# Patient Record
Sex: FEMALE | Race: ASIAN | NOT HISPANIC OR LATINO | URBAN - METROPOLITAN AREA
[De-identification: names, ages, dates, MRNs, and addresses within clinical notes are randomized per-mention and may not be internally consistent; named-entity substitution may affect disease eponyms.]

---

## 2019-12-16 ENCOUNTER — EMERGENCY (EMERGENCY)
Facility: HOSPITAL | Age: 54
LOS: 1 days | Discharge: HOME | End: 2019-12-16
Attending: EMERGENCY MEDICINE | Admitting: EMERGENCY MEDICINE
Payer: MEDICAID

## 2019-12-16 VITALS
SYSTOLIC BLOOD PRESSURE: 150 MMHG | HEART RATE: 61 BPM | HEIGHT: 64 IN | TEMPERATURE: 98 F | DIASTOLIC BLOOD PRESSURE: 69 MMHG | WEIGHT: 169.98 LBS | RESPIRATION RATE: 160 BRPM | OXYGEN SATURATION: 99 %

## 2019-12-16 VITALS
DIASTOLIC BLOOD PRESSURE: 89 MMHG | HEART RATE: 60 BPM | TEMPERATURE: 98 F | OXYGEN SATURATION: 99 % | RESPIRATION RATE: 16 BRPM | SYSTOLIC BLOOD PRESSURE: 152 MMHG

## 2019-12-16 DIAGNOSIS — O00.109 UNSPECIFIED TUBAL PREGNANCY WITHOUT INTRAUTERINE PREGNANCY: Chronic | ICD-10-CM

## 2019-12-16 DIAGNOSIS — D25.9 LEIOMYOMA OF UTERUS, UNSPECIFIED: Chronic | ICD-10-CM

## 2019-12-16 LAB
ALBUMIN SERPL ELPH-MCNC: 4 G/DL — SIGNIFICANT CHANGE UP (ref 3.3–5)
ALP SERPL-CCNC: 95 U/L — SIGNIFICANT CHANGE UP (ref 40–120)
ALT FLD-CCNC: 20 U/L — SIGNIFICANT CHANGE UP (ref 12–78)
ANION GAP SERPL CALC-SCNC: 7 MMOL/L — SIGNIFICANT CHANGE UP (ref 5–17)
APPEARANCE UR: CLEAR — SIGNIFICANT CHANGE UP
AST SERPL-CCNC: 19 U/L — SIGNIFICANT CHANGE UP (ref 15–37)
BACTERIA # UR AUTO: ABNORMAL
BASOPHILS # BLD AUTO: 0.05 K/UL — SIGNIFICANT CHANGE UP (ref 0–0.2)
BASOPHILS NFR BLD AUTO: 0.8 % — SIGNIFICANT CHANGE UP (ref 0–2)
BILIRUB SERPL-MCNC: 0.2 MG/DL — SIGNIFICANT CHANGE UP (ref 0.2–1.2)
BILIRUB UR-MCNC: NEGATIVE — SIGNIFICANT CHANGE UP
BUN SERPL-MCNC: 8 MG/DL — SIGNIFICANT CHANGE UP (ref 7–23)
CALCIUM SERPL-MCNC: 9.4 MG/DL — SIGNIFICANT CHANGE UP (ref 8.5–10.1)
CHLORIDE SERPL-SCNC: 109 MMOL/L — HIGH (ref 96–108)
CO2 SERPL-SCNC: 27 MMOL/L — SIGNIFICANT CHANGE UP (ref 22–31)
COLOR SPEC: SIGNIFICANT CHANGE UP
CREAT SERPL-MCNC: 0.66 MG/DL — SIGNIFICANT CHANGE UP (ref 0.5–1.3)
DIFF PNL FLD: ABNORMAL
EOSINOPHIL # BLD AUTO: 0.13 K/UL — SIGNIFICANT CHANGE UP (ref 0–0.5)
EOSINOPHIL NFR BLD AUTO: 2 % — SIGNIFICANT CHANGE UP (ref 0–6)
EPI CELLS # UR: SIGNIFICANT CHANGE UP
GLUCOSE SERPL-MCNC: 119 MG/DL — HIGH (ref 70–99)
GLUCOSE UR QL: NEGATIVE — SIGNIFICANT CHANGE UP
HCT VFR BLD CALC: 36.3 % — SIGNIFICANT CHANGE UP (ref 34.5–45)
HGB BLD-MCNC: 11.8 G/DL — SIGNIFICANT CHANGE UP (ref 11.5–15.5)
IMM GRANULOCYTES NFR BLD AUTO: 0.3 % — SIGNIFICANT CHANGE UP (ref 0–1.5)
KETONES UR-MCNC: NEGATIVE — SIGNIFICANT CHANGE UP
LEUKOCYTE ESTERASE UR-ACNC: ABNORMAL
LIDOCAIN IGE QN: 177 U/L — SIGNIFICANT CHANGE UP (ref 73–393)
LYMPHOCYTES # BLD AUTO: 2.58 K/UL — SIGNIFICANT CHANGE UP (ref 1–3.3)
LYMPHOCYTES # BLD AUTO: 39.6 % — SIGNIFICANT CHANGE UP (ref 13–44)
MCHC RBC-ENTMCNC: 26.7 PG — LOW (ref 27–34)
MCHC RBC-ENTMCNC: 32.5 GM/DL — SIGNIFICANT CHANGE UP (ref 32–36)
MCV RBC AUTO: 82.1 FL — SIGNIFICANT CHANGE UP (ref 80–100)
MONOCYTES # BLD AUTO: 0.5 K/UL — SIGNIFICANT CHANGE UP (ref 0–0.9)
MONOCYTES NFR BLD AUTO: 7.7 % — SIGNIFICANT CHANGE UP (ref 2–14)
NEUTROPHILS # BLD AUTO: 3.23 K/UL — SIGNIFICANT CHANGE UP (ref 1.8–7.4)
NEUTROPHILS NFR BLD AUTO: 49.6 % — SIGNIFICANT CHANGE UP (ref 43–77)
NITRITE UR-MCNC: NEGATIVE — SIGNIFICANT CHANGE UP
NRBC # BLD: 0 /100 WBCS — SIGNIFICANT CHANGE UP (ref 0–0)
PH UR: 7 — SIGNIFICANT CHANGE UP (ref 5–8)
PLATELET # BLD AUTO: 211 K/UL — SIGNIFICANT CHANGE UP (ref 150–400)
POTASSIUM SERPL-MCNC: 4.6 MMOL/L — SIGNIFICANT CHANGE UP (ref 3.5–5.3)
POTASSIUM SERPL-SCNC: 4.6 MMOL/L — SIGNIFICANT CHANGE UP (ref 3.5–5.3)
PROT SERPL-MCNC: 7.7 G/DL — SIGNIFICANT CHANGE UP (ref 6–8.3)
PROT UR-MCNC: NEGATIVE — SIGNIFICANT CHANGE UP
RBC # BLD: 4.42 M/UL — SIGNIFICANT CHANGE UP (ref 3.8–5.2)
RBC # FLD: 12.3 % — SIGNIFICANT CHANGE UP (ref 10.3–14.5)
RBC CASTS # UR COMP ASSIST: SIGNIFICANT CHANGE UP /HPF (ref 0–4)
SODIUM SERPL-SCNC: 143 MMOL/L — SIGNIFICANT CHANGE UP (ref 135–145)
SP GR SPEC: 1 — LOW (ref 1.01–1.02)
UROBILINOGEN FLD QL: NEGATIVE — SIGNIFICANT CHANGE UP
WBC # BLD: 6.51 K/UL — SIGNIFICANT CHANGE UP (ref 3.8–10.5)
WBC # FLD AUTO: 6.51 K/UL — SIGNIFICANT CHANGE UP (ref 3.8–10.5)
WBC UR QL: SIGNIFICANT CHANGE UP

## 2019-12-16 PROCEDURE — 96361 HYDRATE IV INFUSION ADD-ON: CPT

## 2019-12-16 PROCEDURE — 80053 COMPREHEN METABOLIC PANEL: CPT

## 2019-12-16 PROCEDURE — 36415 COLL VENOUS BLD VENIPUNCTURE: CPT

## 2019-12-16 PROCEDURE — 99284 EMERGENCY DEPT VISIT MOD MDM: CPT | Mod: 25

## 2019-12-16 PROCEDURE — 74177 CT ABD & PELVIS W/CONTRAST: CPT | Mod: 26

## 2019-12-16 PROCEDURE — 74177 CT ABD & PELVIS W/CONTRAST: CPT

## 2019-12-16 PROCEDURE — 83690 ASSAY OF LIPASE: CPT

## 2019-12-16 PROCEDURE — 96374 THER/PROPH/DIAG INJ IV PUSH: CPT | Mod: XU

## 2019-12-16 PROCEDURE — 99284 EMERGENCY DEPT VISIT MOD MDM: CPT

## 2019-12-16 PROCEDURE — 85027 COMPLETE CBC AUTOMATED: CPT

## 2019-12-16 PROCEDURE — 81001 URINALYSIS AUTO W/SCOPE: CPT

## 2019-12-16 RX ORDER — SUCRALFATE 1 G
10 TABLET ORAL
Qty: 150 | Refills: 0
Start: 2019-12-16 | End: 2019-12-20

## 2019-12-16 RX ORDER — SODIUM CHLORIDE 9 MG/ML
1000 INJECTION INTRAMUSCULAR; INTRAVENOUS; SUBCUTANEOUS ONCE
Refills: 0 | Status: COMPLETED | OUTPATIENT
Start: 2019-12-16 | End: 2019-12-16

## 2019-12-16 RX ORDER — SIMVASTATIN 20 MG/1
1 TABLET, FILM COATED ORAL
Qty: 0 | Refills: 0 | DISCHARGE

## 2019-12-16 RX ORDER — FAMOTIDINE 10 MG/ML
20 INJECTION INTRAVENOUS ONCE
Refills: 0 | Status: COMPLETED | OUTPATIENT
Start: 2019-12-16 | End: 2019-12-16

## 2019-12-16 RX ORDER — OMEPRAZOLE 10 MG/1
1 CAPSULE, DELAYED RELEASE ORAL
Qty: 0 | Refills: 0 | DISCHARGE

## 2019-12-16 RX ORDER — SUCRALFATE 1 G
1 TABLET ORAL ONCE
Refills: 0 | Status: COMPLETED | OUTPATIENT
Start: 2019-12-16 | End: 2019-12-16

## 2019-12-16 RX ADMIN — SODIUM CHLORIDE 1000 MILLILITER(S): 9 INJECTION INTRAMUSCULAR; INTRAVENOUS; SUBCUTANEOUS at 21:42

## 2019-12-16 RX ADMIN — FAMOTIDINE 20 MILLIGRAM(S): 10 INJECTION INTRAVENOUS at 22:36

## 2019-12-16 RX ADMIN — SODIUM CHLORIDE 1000 MILLILITER(S): 9 INJECTION INTRAMUSCULAR; INTRAVENOUS; SUBCUTANEOUS at 20:42

## 2019-12-16 RX ADMIN — Medication 1 GRAM(S): at 22:36

## 2019-12-16 NOTE — ED ADULT NURSE NOTE - OBJECTIVE STATEMENT
Patient came in to ED c/p lauren umbilical pain x 2-3 days worse after eating. Patient denies nausea/ vomiting, no  fever but reports body aches yesterday and took tylenol. States no pain at this time.

## 2019-12-16 NOTE — ED ADULT NURSE NOTE - NSIMPLEMENTINTERV_GEN_ALL_ED
Implemented All Universal Safety Interventions:  [place] to call system. Call bell, personal items and telephone within reach. Instruct patient to call for assistance. Room bathroom lighting operational. Non-slip footwear when patient is off stretcher. Physically safe environment: no spills, clutter or unnecessary equipment. Stretcher in lowest position, wheels locked, appropriate side rails in place.

## 2019-12-16 NOTE — ED PROVIDER NOTE - OBJECTIVE STATEMENT
53yo female who presents with abd pain for 3days. pt c/o diffuse periumbilical, no vomiting or diarrhea, no fever, chills, pt did not take anything for the pain

## 2019-12-16 NOTE — ED PROVIDER NOTE - PATIENT PORTAL LINK FT
You can access the FollowMyHealth Patient Portal offered by [facility] by registering at the following website: [email or web address]. By joining [email or web address]’s FollowMyHealth portal, you will also be able to view your health information using other applications (apps) compatible with our system.

## 2020-07-18 PROBLEM — K21.9 GASTRO-ESOPHAGEAL REFLUX DISEASE WITHOUT ESOPHAGITIS: Chronic | Status: ACTIVE | Noted: 2019-12-16

## 2020-07-18 PROBLEM — E78.00 PURE HYPERCHOLESTEROLEMIA, UNSPECIFIED: Chronic | Status: ACTIVE | Noted: 2019-12-16

## 2020-08-27 ENCOUNTER — APPOINTMENT (OUTPATIENT)
Dept: MAMMOGRAPHY | Facility: IMAGING CENTER | Age: 55
End: 2020-08-27

## 2020-08-27 ENCOUNTER — APPOINTMENT (OUTPATIENT)
Dept: ULTRASOUND IMAGING | Facility: IMAGING CENTER | Age: 55
End: 2020-08-27

## 2024-01-07 ENCOUNTER — EMERGENCY (EMERGENCY)
Facility: HOSPITAL | Age: 59
LOS: 1 days | Discharge: HOME | End: 2024-01-07
Attending: EMERGENCY MEDICINE
Payer: MEDICAID

## 2024-01-07 VITALS
DIASTOLIC BLOOD PRESSURE: 86 MMHG | RESPIRATION RATE: 16 BRPM | HEART RATE: 79 BPM | OXYGEN SATURATION: 97 % | SYSTOLIC BLOOD PRESSURE: 150 MMHG

## 2024-01-07 VITALS
HEIGHT: 64 IN | RESPIRATION RATE: 16 BRPM | OXYGEN SATURATION: 99 % | TEMPERATURE: 99 F | DIASTOLIC BLOOD PRESSURE: 86 MMHG | WEIGHT: 175.93 LBS | SYSTOLIC BLOOD PRESSURE: 169 MMHG | HEART RATE: 80 BPM

## 2024-01-07 DIAGNOSIS — O00.109 UNSPECIFIED TUBAL PREGNANCY WITHOUT INTRAUTERINE PREGNANCY: Chronic | ICD-10-CM

## 2024-01-07 DIAGNOSIS — Z87.59 PERSONAL HISTORY OF OTHER COMPLICATIONS OF PREGNANCY, CHILDBIRTH AND THE PUERPERIUM: Chronic | ICD-10-CM

## 2024-01-07 DIAGNOSIS — Z98.890 OTHER SPECIFIED POSTPROCEDURAL STATES: Chronic | ICD-10-CM

## 2024-01-07 DIAGNOSIS — D25.9 LEIOMYOMA OF UTERUS, UNSPECIFIED: Chronic | ICD-10-CM

## 2024-01-07 LAB
ALBUMIN SERPL ELPH-MCNC: 4.4 G/DL — SIGNIFICANT CHANGE UP (ref 3.3–5)
ALBUMIN SERPL ELPH-MCNC: 4.4 G/DL — SIGNIFICANT CHANGE UP (ref 3.3–5)
ALP SERPL-CCNC: 70 U/L — SIGNIFICANT CHANGE UP (ref 40–120)
ALP SERPL-CCNC: 70 U/L — SIGNIFICANT CHANGE UP (ref 40–120)
ALT FLD-CCNC: 19 U/L — SIGNIFICANT CHANGE UP (ref 10–45)
ALT FLD-CCNC: 19 U/L — SIGNIFICANT CHANGE UP (ref 10–45)
ANION GAP SERPL CALC-SCNC: 11 MMOL/L — SIGNIFICANT CHANGE UP (ref 5–17)
ANION GAP SERPL CALC-SCNC: 11 MMOL/L — SIGNIFICANT CHANGE UP (ref 5–17)
AST SERPL-CCNC: 22 U/L — SIGNIFICANT CHANGE UP (ref 10–40)
AST SERPL-CCNC: 22 U/L — SIGNIFICANT CHANGE UP (ref 10–40)
BASOPHILS # BLD AUTO: 0.02 K/UL — SIGNIFICANT CHANGE UP (ref 0–0.2)
BASOPHILS # BLD AUTO: 0.02 K/UL — SIGNIFICANT CHANGE UP (ref 0–0.2)
BASOPHILS NFR BLD AUTO: 0.4 % — SIGNIFICANT CHANGE UP (ref 0–2)
BASOPHILS NFR BLD AUTO: 0.4 % — SIGNIFICANT CHANGE UP (ref 0–2)
BILIRUB SERPL-MCNC: 0.3 MG/DL — SIGNIFICANT CHANGE UP (ref 0.2–1.2)
BILIRUB SERPL-MCNC: 0.3 MG/DL — SIGNIFICANT CHANGE UP (ref 0.2–1.2)
BUN SERPL-MCNC: 8 MG/DL — SIGNIFICANT CHANGE UP (ref 7–23)
BUN SERPL-MCNC: 8 MG/DL — SIGNIFICANT CHANGE UP (ref 7–23)
CALCIUM SERPL-MCNC: 9.2 MG/DL — SIGNIFICANT CHANGE UP (ref 8.4–10.5)
CALCIUM SERPL-MCNC: 9.2 MG/DL — SIGNIFICANT CHANGE UP (ref 8.4–10.5)
CHLORIDE SERPL-SCNC: 103 MMOL/L — SIGNIFICANT CHANGE UP (ref 96–108)
CHLORIDE SERPL-SCNC: 103 MMOL/L — SIGNIFICANT CHANGE UP (ref 96–108)
CO2 SERPL-SCNC: 25 MMOL/L — SIGNIFICANT CHANGE UP (ref 22–31)
CO2 SERPL-SCNC: 25 MMOL/L — SIGNIFICANT CHANGE UP (ref 22–31)
CREAT SERPL-MCNC: 0.62 MG/DL — SIGNIFICANT CHANGE UP (ref 0.5–1.3)
CREAT SERPL-MCNC: 0.62 MG/DL — SIGNIFICANT CHANGE UP (ref 0.5–1.3)
EGFR: 103 ML/MIN/1.73M2 — SIGNIFICANT CHANGE UP
EGFR: 103 ML/MIN/1.73M2 — SIGNIFICANT CHANGE UP
EOSINOPHIL # BLD AUTO: 0.12 K/UL — SIGNIFICANT CHANGE UP (ref 0–0.5)
EOSINOPHIL # BLD AUTO: 0.12 K/UL — SIGNIFICANT CHANGE UP (ref 0–0.5)
EOSINOPHIL NFR BLD AUTO: 2.7 % — SIGNIFICANT CHANGE UP (ref 0–6)
EOSINOPHIL NFR BLD AUTO: 2.7 % — SIGNIFICANT CHANGE UP (ref 0–6)
FLUAV AG NPH QL: DETECTED
FLUAV AG NPH QL: DETECTED
FLUBV AG NPH QL: SIGNIFICANT CHANGE UP
FLUBV AG NPH QL: SIGNIFICANT CHANGE UP
GLUCOSE SERPL-MCNC: 115 MG/DL — HIGH (ref 70–99)
GLUCOSE SERPL-MCNC: 115 MG/DL — HIGH (ref 70–99)
HCT VFR BLD CALC: 38.2 % — SIGNIFICANT CHANGE UP (ref 34.5–45)
HCT VFR BLD CALC: 38.2 % — SIGNIFICANT CHANGE UP (ref 34.5–45)
HGB BLD-MCNC: 12.6 G/DL — SIGNIFICANT CHANGE UP (ref 11.5–15.5)
HGB BLD-MCNC: 12.6 G/DL — SIGNIFICANT CHANGE UP (ref 11.5–15.5)
IMM GRANULOCYTES NFR BLD AUTO: 0.2 % — SIGNIFICANT CHANGE UP (ref 0–0.9)
IMM GRANULOCYTES NFR BLD AUTO: 0.2 % — SIGNIFICANT CHANGE UP (ref 0–0.9)
LYMPHOCYTES # BLD AUTO: 1.61 K/UL — SIGNIFICANT CHANGE UP (ref 1–3.3)
LYMPHOCYTES # BLD AUTO: 1.61 K/UL — SIGNIFICANT CHANGE UP (ref 1–3.3)
LYMPHOCYTES # BLD AUTO: 36.1 % — SIGNIFICANT CHANGE UP (ref 13–44)
LYMPHOCYTES # BLD AUTO: 36.1 % — SIGNIFICANT CHANGE UP (ref 13–44)
MAGNESIUM SERPL-MCNC: 2.1 MG/DL — SIGNIFICANT CHANGE UP (ref 1.6–2.6)
MAGNESIUM SERPL-MCNC: 2.1 MG/DL — SIGNIFICANT CHANGE UP (ref 1.6–2.6)
MCHC RBC-ENTMCNC: 27.5 PG — SIGNIFICANT CHANGE UP (ref 27–34)
MCHC RBC-ENTMCNC: 27.5 PG — SIGNIFICANT CHANGE UP (ref 27–34)
MCHC RBC-ENTMCNC: 33 GM/DL — SIGNIFICANT CHANGE UP (ref 32–36)
MCHC RBC-ENTMCNC: 33 GM/DL — SIGNIFICANT CHANGE UP (ref 32–36)
MCV RBC AUTO: 83.4 FL — SIGNIFICANT CHANGE UP (ref 80–100)
MCV RBC AUTO: 83.4 FL — SIGNIFICANT CHANGE UP (ref 80–100)
MONOCYTES # BLD AUTO: 0.37 K/UL — SIGNIFICANT CHANGE UP (ref 0–0.9)
MONOCYTES # BLD AUTO: 0.37 K/UL — SIGNIFICANT CHANGE UP (ref 0–0.9)
MONOCYTES NFR BLD AUTO: 8.3 % — SIGNIFICANT CHANGE UP (ref 2–14)
MONOCYTES NFR BLD AUTO: 8.3 % — SIGNIFICANT CHANGE UP (ref 2–14)
NEUTROPHILS # BLD AUTO: 2.33 K/UL — SIGNIFICANT CHANGE UP (ref 1.8–7.4)
NEUTROPHILS # BLD AUTO: 2.33 K/UL — SIGNIFICANT CHANGE UP (ref 1.8–7.4)
NEUTROPHILS NFR BLD AUTO: 52.3 % — SIGNIFICANT CHANGE UP (ref 43–77)
NEUTROPHILS NFR BLD AUTO: 52.3 % — SIGNIFICANT CHANGE UP (ref 43–77)
NRBC # BLD: 0 /100 WBCS — SIGNIFICANT CHANGE UP (ref 0–0)
NRBC # BLD: 0 /100 WBCS — SIGNIFICANT CHANGE UP (ref 0–0)
PLATELET # BLD AUTO: 201 K/UL — SIGNIFICANT CHANGE UP (ref 150–400)
PLATELET # BLD AUTO: 201 K/UL — SIGNIFICANT CHANGE UP (ref 150–400)
POTASSIUM SERPL-MCNC: 3.5 MMOL/L — SIGNIFICANT CHANGE UP (ref 3.5–5.3)
POTASSIUM SERPL-MCNC: 3.5 MMOL/L — SIGNIFICANT CHANGE UP (ref 3.5–5.3)
POTASSIUM SERPL-SCNC: 3.5 MMOL/L — SIGNIFICANT CHANGE UP (ref 3.5–5.3)
POTASSIUM SERPL-SCNC: 3.5 MMOL/L — SIGNIFICANT CHANGE UP (ref 3.5–5.3)
PROT SERPL-MCNC: 7.7 G/DL — SIGNIFICANT CHANGE UP (ref 6–8.3)
PROT SERPL-MCNC: 7.7 G/DL — SIGNIFICANT CHANGE UP (ref 6–8.3)
RBC # BLD: 4.58 M/UL — SIGNIFICANT CHANGE UP (ref 3.8–5.2)
RBC # BLD: 4.58 M/UL — SIGNIFICANT CHANGE UP (ref 3.8–5.2)
RBC # FLD: 12.3 % — SIGNIFICANT CHANGE UP (ref 10.3–14.5)
RBC # FLD: 12.3 % — SIGNIFICANT CHANGE UP (ref 10.3–14.5)
RSV RNA NPH QL NAA+NON-PROBE: SIGNIFICANT CHANGE UP
RSV RNA NPH QL NAA+NON-PROBE: SIGNIFICANT CHANGE UP
SARS-COV-2 RNA SPEC QL NAA+PROBE: SIGNIFICANT CHANGE UP
SARS-COV-2 RNA SPEC QL NAA+PROBE: SIGNIFICANT CHANGE UP
SODIUM SERPL-SCNC: 139 MMOL/L — SIGNIFICANT CHANGE UP (ref 135–145)
SODIUM SERPL-SCNC: 139 MMOL/L — SIGNIFICANT CHANGE UP (ref 135–145)
TROPONIN T, HIGH SENSITIVITY RESULT: <6 NG/L — SIGNIFICANT CHANGE UP (ref 0–51)
TROPONIN T, HIGH SENSITIVITY RESULT: <6 NG/L — SIGNIFICANT CHANGE UP (ref 0–51)
WBC # BLD: 4.46 K/UL — SIGNIFICANT CHANGE UP (ref 3.8–10.5)
WBC # BLD: 4.46 K/UL — SIGNIFICANT CHANGE UP (ref 3.8–10.5)
WBC # FLD AUTO: 4.46 K/UL — SIGNIFICANT CHANGE UP (ref 3.8–10.5)
WBC # FLD AUTO: 4.46 K/UL — SIGNIFICANT CHANGE UP (ref 3.8–10.5)

## 2024-01-07 PROCEDURE — 99285 EMERGENCY DEPT VISIT HI MDM: CPT | Mod: 25

## 2024-01-07 PROCEDURE — 85025 COMPLETE CBC W/AUTO DIFF WBC: CPT

## 2024-01-07 PROCEDURE — 71046 X-RAY EXAM CHEST 2 VIEWS: CPT

## 2024-01-07 PROCEDURE — 93005 ELECTROCARDIOGRAM TRACING: CPT

## 2024-01-07 PROCEDURE — 80053 COMPREHEN METABOLIC PANEL: CPT

## 2024-01-07 PROCEDURE — 71046 X-RAY EXAM CHEST 2 VIEWS: CPT | Mod: 26

## 2024-01-07 PROCEDURE — 84484 ASSAY OF TROPONIN QUANT: CPT

## 2024-01-07 PROCEDURE — 99285 EMERGENCY DEPT VISIT HI MDM: CPT

## 2024-01-07 PROCEDURE — 83735 ASSAY OF MAGNESIUM: CPT

## 2024-01-07 PROCEDURE — 36415 COLL VENOUS BLD VENIPUNCTURE: CPT

## 2024-01-07 PROCEDURE — 87637 SARSCOV2&INF A&B&RSV AMP PRB: CPT

## 2024-01-07 RX ORDER — ACETAMINOPHEN 500 MG
2 TABLET ORAL
Qty: 10 | Refills: 0
Start: 2024-01-07 | End: 2024-01-11

## 2024-01-07 NOTE — ED PROVIDER NOTE - CLINICAL SUMMARY MEDICAL DECISION MAKING FREE TEXT BOX
58-year-old female past medical history of gastritis, hypercholesterolemia, now presenting with ongoing dry cough, wheeze like sensation, left chest pain. HD stable. On exam reproducible left CW pain, R-basal creps. Likely Comm. acq. PNA. Will get labs, CXR. Will r/o ACS given c/o chest pain. 58-year-old female past medical history of gastritis, hypercholesterolemia, now presenting with ongoing dry cough, wheeze like sensation, left chest pain. HD stable. On exam reproducible left CW pain, R-basal creps. Likely Comm. acq. PNA. Will get labs, CXR. Will r/o ACS given c/o chest pain.    jens: 58 year old female with pmhx of gastritis here with ongoing dry cough, wheezing, left sided chest pain. Patient recently treated for CAP with z-pack, here for chest pain. PE: att exam: patient awake alert NAD . LUNGS R basilar crepitus, left chest wall ttp reproducible tenderness. CARD RRR no m/r/g.  Abdomen soft NT ND no rebound no guarding no CVA tenderness. EXT WWP no edema no calf tenderness CV 2+DP/PT bilaterally. neuro A&Ox3 gait normal.  skin warm and dry no rash. Plan: Patient with chest discomfort, otherwise well appearing. low risk for acs however will send labss, ekg, cardiac enzymes to r/o acs. will get cxr r/o pna, r/o viral syndrome. will possible broaden coverage for abx for CAP.

## 2024-01-07 NOTE — ED ADULT NURSE NOTE - CAS EDP DISCH TYPE
[No Acute Distress] : no acute distress [Well Nourished] : well nourished [Well Developed] : well developed [Well-Appearing] : well-appearing [Normal Sclera/Conjunctiva] : normal sclera/conjunctiva [PERRL] : pupils equal round and reactive to light [EOMI] : extraocular movements intact [Normal Outer Ear/Nose] : the outer ears and nose were normal in appearance [Normal Oropharynx] : the oropharynx was normal [No JVD] : no jugular venous distention [No Lymphadenopathy] : no lymphadenopathy [Supple] : supple [Thyroid Normal, No Nodules] : the thyroid was normal and there were no nodules present [No Respiratory Distress] : no respiratory distress  [No Accessory Muscle Use] : no accessory muscle use [Clear to Auscultation] : lungs were clear to auscultation bilaterally [Normal Rate] : normal rate  [Regular Rhythm] : with a regular rhythm [Normal S1, S2] : normal S1 and S2 [No Carotid Bruits] : no carotid bruits [No Abdominal Bruit] : a ~M bruit was not heard ~T in the abdomen [No Varicosities] : no varicosities [Pedal Pulses Present] : the pedal pulses are present [No Edema] : there was no peripheral edema [No Palpable Aorta] : no palpable aorta [No Extremity Clubbing/Cyanosis] : no extremity clubbing/cyanosis [Soft] : abdomen soft [Non Tender] : non-tender [Non-distended] : non-distended [No Masses] : no abdominal mass palpated [No HSM] : no HSM [Normal Bowel Sounds] : normal bowel sounds [Normal Posterior Cervical Nodes] : no posterior cervical lymphadenopathy [Normal Anterior Cervical Nodes] : no anterior cervical lymphadenopathy [No Focal Deficits] : no focal deficits [Normal Gait] : normal gait [Normal Affect] : the affect was normal [Normal Insight/Judgement] : insight and judgment were intact [de-identified] : 2/[facility] [place] [employer] [place]

## 2024-01-07 NOTE — ED PROVIDER NOTE - PROGRESS NOTE DETAILS
Kelly VIEYRA: Patient re-evaluated and feeling improved.  Lab and radiology tests reviewed with patient and family.  Patient is stable for discharge. Will offer Abx. for c/o bacterial PNA based on protracted symptoms and clinical findings. follow up with PCP. Follow up instructions given, importance of follow up emphasized, return to ED parameters reviewed and patient verbalized understanding.  All questions answered, all concerns addressed.

## 2024-01-07 NOTE — ED PROVIDER NOTE - NSFOLLOWUPCLINICS_GEN_ALL_ED_FT
[facility] Pulmonolgy and Sleep Medicine  Pulmonology  [address] [place] [address], [place] [address]  Phone: [phone]  Fax:      [facility]ol[facility] and [facility]  Pulmonology  [address] [place] [address], [place] [address]  Phone: [phone]  Fax:

## 2024-01-07 NOTE — ED PROVIDER NOTE - PATIENT PORTAL LINK FT
You can access the FollowMyHealth Patient Portal offered by [facility] by registering at the following website: [email or web address]. By joining [email or web address]’s FollowMyHealth portal, you will also be able to view your health information using other applications (apps) compatible with our system. You can access the FollowMyHealth Patient Portal offered by [facility] by registering at the following website: [email or web address]. By joining [employer]’s FollowMyHealth portal, you will also be able to view your health information using other applications (apps) compatible with our system.

## 2024-01-07 NOTE — ED PROVIDER NOTE - PHYSICAL EXAMINATION
PHYSICAL EXAM:  GENERAL: NAD, lying in bed comfortably  HEAD:  Atraumatic, Normocephalic  EYES: EOMI, PERRLA, conjunctiva and sclera clear  ENT: Hyperemic throat, no tonsillar exudates/enlargement   NECK: Supple, No JVD  LUNG: R-basal creps   HEART: RRR, +S1/S2; No m/r/g  ABDOMEN: soft, NT/ND; BS audible   EXTREMITIES:  2+ Peripheral Pulses, brisk cap refill. No clubbing, cyanosis, or edema  NERVOUS SYSTEM:  AAOx3, speech clear. No sensory/motor deficits   MSK: Left CW reproducible pain to palpation   SKIN: No rashes or lesions

## 2024-01-07 NOTE — ED ADULT NURSE NOTE - NSFALLUNIVINTERV_ED_ALL_ED
Bed/Stretcher in lowest position, wheels locked, appropriate side rails in place/Call bell, personal items and telephone in reach/Instruct patient to call for assistance before getting out of bed/chair/stretcher/Non-slip footwear applied when patient is off stretcher/[place] to call system/Physically safe environment - no spills, clutter or unnecessary equipment/Purposeful proactive rounding/Room/bathroom lighting operational, light cord in reach Bed/Stretcher in lowest position, wheels locked, appropriate side rails in place/Call bell, personal items and telephone in reach/Instruct patient to call for assistance before getting out of bed/chair/stretcher/Non-slip footwear applied when patient is off stretcher/[place] to call system/Physically safe environment - no spills, clutter or unnecessary equipment/Purposeful proactive rounding/Room/bathroom lighting operational, light cord in reach

## 2024-01-07 NOTE — ED ADULT NURSE NOTE - OBJECTIVE STATEMENT
Pti s 58y F with no PMH complaining of 6 days of cough, chills, congestion, fever, chest pain. Pt reports onset of symptoms 1/1, prescribed Z-pack by outpatient provider with no relief. Reports L sided chest pain radiating to L back. Also endorses congestion and diaphoresis. Upon assessment, A&Ox4, reports endorses L chest pain radiating to L back, with no SOB, n/v, lightheadedness. Family at bedside.

## 2024-01-07 NOTE — ED PROVIDER NOTE - NSFOLLOWUPINSTRUCTIONS_ED_ALL_ED_FT
Pneumonia is an infection of the lungs. It causes irritation and swelling in the airways of the lungs. Mucus and fluid may also build up inside the airways. This may cause coughing and trouble breathing.    One type of pneumonia can happen while your child is in a hospital. A different type can happen when your child is not in a hospital (community-acquired pneumonia).    What are the causes?  This condition is caused by germs (viruses or bacteria). Some types of germs can spread from person to person. Pneumonia is not thought to spread from person to person.    What increases the risk?  Your child is more likely to get pneumonia during the fall, winter, and spring. This is when children spend more time indoors and are near others.    What are the signs or symptoms?  Symptoms depend on your child's age and the cause of the illness. Pneumonia may be mild if caused by a virus. Symptoms may start slowly. If bacteria caused the pneumonia, symptoms may start fast. Fever may be higher.    Common symptoms of this condition include:  A cough.  A fever or chills.  Breathing problems, such as:  Shortness of breath.  Fast or shallow breathing.  Making high-pitched whistling sounds when breathing, most often when breathing out (wheezing).  Nostrils that open wide during breathing.  Pain in the chest or belly (abdomen).  Feeling tired.  Not wanting to eat.  Not wanting to play.  How is this treated?  Treatment for this condition depends on the cause and the symptoms.  Your child may be treated at home with rest or with:  Medicines to kill the germs.  Breathing therapy.  You may need to take your child to the hospital if:  Your child has a very bad infection. If your child's infection is very bad, they may:  Have a machine to help with breathing.  Have fluid taken away from around the lungs.  Follow these instructions at home:  Medicines    A sign showing not to give aspirin.  Give over-the-counter and prescription medicines only as told by your child's doctor.  If your child was prescribed an antibiotic medicine, give it as told by your child's doctor. Do not stop giving the antibiotic even if your child starts to feel better.  Do not give your child aspirin.  If your child is 4–6 years old, use cough medicine only as told by your child's doctor.  Give cough medicine only to help your child rest or sleep.  Do not give cough medicine if your child is younger than 4 years of age.  Activity    Be sure your child rests a lot. Your child may be tired and may want to do fewer things than normal.  Have your child return to their normal activities as told by your child's doctor. Ask the doctor what activities are safe for your child.  General instructions    A comparison of three sample cups showing dark yellow, yellow, and pale yellow urine.  Have your child sleep with the head and neck raised. Lying down makes coughing worse. To help with coughing during sleep:  Put more than one pillow under your child's head.  Have your child sleep in a reclining chair.  Loosen your child's mucus in their lungs (sputum):  Put a cool steam vaporizer or humidifier in your child's room. These machines add moisture to the air.  Have your child drink enough fluids to keep their pee (urine) pale yellow.  Wash your hands for at least 20 seconds before and after you touch your child. If you cannot use soap and water, use hand . Ask other people in your household to wash their hands often, too.  Keep your child away from smoke. Smoke can make symptoms worse.  Give your child a healthy diet. This includes a lot of vegetables, fruits, whole grains, low-fat dairy products, and low-fat (lean) protein.  Keep all follow-up visits.  How is pneumonia prevented?    Keep your child's shots (vaccines) up to date.  Make sure that you and everyone who cares for your child get shots for the flu and whooping cough (pertussis).  Contact a doctor if:  Your child gets new symptoms.  Your child's symptoms do not get better after 3 days of treatment, or as told by your child's doctor.  Your child's symptoms get worse over time.  Get help right away if:  Your child has breathing problems, such as:  Fast breathing.  Being short of breath and not able to talk normally.  Grunting sounds when your child breathes out.  Pain with breathing.  Loud breathing.  The spaces between the ribs or under the ribs pull in when your child breathes in.  Nostrils that open wide during breathing.  Your child who is younger than 3 months has a temperature of 100.4°F (38°C) or higher.  Your child who is 3 months to 3 years old has a temperature of 102.2°F (39°C) or higher.  Your child coughs up blood.  Your child vomits often.  Any symptoms get worse all of a sudden.  Your child's lips, face, or nails turn blue.  These symptoms may be an emergency. Do not wait to see if the symptoms will go away. Get help right away. Call 911.    Summary  A type of pneumonia can happen when your child is not in a hospital (community-acquired pneumonia). It may be caused by different germs.  Treatment for this condition depends on the cause and the symptoms.  Contact a doctor if your child gets new symptoms or has symptoms that do not get better after 3 days of treatment, or as told by your child's doctor.  This information is not intended to replace advice given to you by your health care provider. Make sure you discuss any questions you have with your health care provider.

## 2024-01-07 NOTE — ED PROVIDER NOTE - OBJECTIVE STATEMENT
58-year-old female past medical history of gastritis, hypercholesterolemia, now presenting with history of dry cough, fever, sore throat on 12/29, patient visited her PCP and was advised azithromycin -being her last day, patient reports improvement in fever, however has ongoing dry cough upon lying down and mentions no dyspnea or wheezing like sound noticed on 01/05.  Also reports experiencing left-sided chest pressure radiating to her left scapula, moderate in intensity, not accompanied with shortness of breath/lightheadedness/syncope/diaphoresis/palpitations.  Also has been having left-sided frontal headache for past 3 days. Patient reports being tested COVID -ve, has sick individuals at home.

## 2024-01-07 NOTE — ED PROVIDER NOTE - NS ED ROS FT
CONSTITUTIONAL: No weakness, fevers or chills  EYES/ENT: No visual changes;  No vertigo or throat pain   NECK: No pain or stiffness  RESPIRATORY: c/o cough, wheezing like sensation   CARDIOVASCULAR: c/o chest pain   GASTROINTESTINAL: No abdominal or epigastric pain. No nausea, vomiting, diarrhea or constipation.   GENITOURINARY: No dysuria, frequency   NEUROLOGICAL: c/o headache   SKIN: No itching, burning, rashes, or lesions     All other review of systems is negative unless indicated above.

## 2024-01-17 PROBLEM — Z00.00 ENCOUNTER FOR PREVENTIVE HEALTH EXAMINATION: Status: ACTIVE | Noted: 2024-01-17

## 2024-01-18 ENCOUNTER — APPOINTMENT (OUTPATIENT)
Dept: PULMONOLOGY | Facility: CLINIC | Age: 59
End: 2024-01-18
Payer: MEDICAID

## 2024-01-18 VITALS
DIASTOLIC BLOOD PRESSURE: 70 MMHG | WEIGHT: 173 LBS | BODY MASS INDEX: 29.53 KG/M2 | HEART RATE: 72 BPM | OXYGEN SATURATION: 99 % | SYSTOLIC BLOOD PRESSURE: 115 MMHG | HEIGHT: 64 IN

## 2024-01-18 DIAGNOSIS — K27.9 PEPTIC ULCER, SITE UNSPECIFIED, UNSPECIFIED AS ACUTE OR CHRONIC, W/OUT HEMORRHAGE OR PERFORATION: ICD-10-CM

## 2024-01-18 DIAGNOSIS — M17.10 UNILATERAL PRIMARY OSTEOARTHRITIS, UNSPECIFIED KNEE: ICD-10-CM

## 2024-01-18 DIAGNOSIS — Z82.49 FAMILY HISTORY OF ISCHEMIC HEART DISEASE AND OTHER DISEASES OF THE CIRCULATORY SYSTEM: ICD-10-CM

## 2024-01-18 DIAGNOSIS — Z82.41 FAMILY HISTORY OF SUDDEN CARDIAC DEATH: ICD-10-CM

## 2024-01-18 DIAGNOSIS — E78.00 PURE HYPERCHOLESTEROLEMIA, UNSPECIFIED: ICD-10-CM

## 2024-01-18 DIAGNOSIS — J06.9 ACUTE UPPER RESPIRATORY INFECTION, UNSPECIFIED: ICD-10-CM

## 2024-01-18 DIAGNOSIS — Z83.1 FAMILY HISTORY OF OTHER INFECTIOUS AND PARASITIC DISEASES: ICD-10-CM

## 2024-01-18 DIAGNOSIS — K21.9 GASTRO-ESOPHAGEAL REFLUX DISEASE W/OUT ESOPHAGITIS: ICD-10-CM

## 2024-01-18 PROCEDURE — ZZZZZ: CPT

## 2024-01-18 PROCEDURE — 94060 EVALUATION OF WHEEZING: CPT

## 2024-01-18 PROCEDURE — 94729 DIFFUSING CAPACITY: CPT

## 2024-01-18 PROCEDURE — 99205 OFFICE O/P NEW HI 60 MIN: CPT | Mod: 25

## 2024-01-18 PROCEDURE — 94726 PLETHYSMOGRAPHY LUNG VOLUMES: CPT

## 2024-01-18 RX ORDER — SIMVASTATIN 40 MG/1
40 TABLET, FILM COATED ORAL
Refills: 0 | Status: ACTIVE | COMMUNITY

## 2024-01-18 RX ORDER — ACETAMINOPHEN 500 MG/1
500 TABLET, COATED ORAL
Refills: 0 | Status: ACTIVE | COMMUNITY

## 2024-01-18 RX ORDER — METHYLPREDNISOLONE 4 MG/1
4 TABLET ORAL
Qty: 1 | Refills: 0 | Status: ACTIVE | COMMUNITY
Start: 2024-01-18 | End: 1900-01-01

## 2024-01-18 RX ORDER — BUDESONIDE AND FORMOTEROL FUMARATE DIHYDRATE 160; 4.5 UG/1; UG/1
160-4.5 AEROSOL RESPIRATORY (INHALATION) TWICE DAILY
Qty: 1 | Refills: 5 | Status: ACTIVE | COMMUNITY
Start: 2024-01-18 | End: 1900-01-01

## 2024-01-18 RX ORDER — ALUMINUM HYDROXIDE AND MAGNESIUM CARBONATE 95; 358 MG/15ML; MG/15ML
LIQUID ORAL
Refills: 0 | Status: ACTIVE | COMMUNITY

## 2024-01-18 RX ORDER — FAMOTIDINE 20 MG/1
20 TABLET, FILM COATED ORAL
Refills: 0 | Status: ACTIVE | COMMUNITY

## 2024-01-18 NOTE — REVIEW OF SYSTEMS
[Nasal Congestion] : nasal congestion [Sinus Problems] : sinus problems [Cough] : cough [Chest Tightness] : chest tightness [Sputum] : sputum [Dyspnea] : dyspnea [Wheezing] : wheezing [Negative] : Endocrine

## 2024-01-18 NOTE — CONSULT LETTER
[Dear  ___] : Dear  [unfilled], [Consult Letter:] : I had the pleasure of evaluating your patient, [unfilled]. [Please see my note below.] : Please see my note below. [Consult Closing:] : Thank you very much for allowing me to participate in the care of this patient.  If you have any questions, please do not hesitate to contact me. [Sincerely,] : Sincerely, [FreeTextEntry2] : Dr. Teri Johnson MD Fax: [phone] [FreeTextEntry3] : Mario Cobb MD Attending Physician in Pulmonary & Critical Care Medicine  of [facility] [employer] [facility]

## 2024-01-18 NOTE — ASSESSMENT
[FreeTextEntry1] : -  Ms. Cosme is a 58 year-old female with a history of GERD, PUD, HLD, and seasonal allergies who presents for evaluation after recent ED visit on 1/7 for dry cough, fever, chills, wheezing, dyspnea, and sore throat that had started on 12/29. She was initially given azithromycin with improvement in fever but with persistent dry cough and development of left-sided chest pressure and headache. She was found to have normal CBC and CMP. CXR with clear lungs. Viral studies positive for influenza A. She was out of the window for oseltamivir, but was treated with doxycycline, Augmentin, and benzonatate. Currently, she continues to experience cough along with wheezing, chest tightness, and chest discomfort bilaterally with associated tenderness to palpation. She did not receive any steroids. She uses fluticasone nasal spray 2 sprays per nostril once daily along with Neti pot nasal saline irrigation and steam. She takes loratadine 10 mg daily. Myalgias have improved.  PFTs (Jan 2024) with mild restriction, low TLC with low normal RV, mildly reduced diffusing capacity. There is no BD response in FEV1 or FVC, but there is a BD response in BIR91-93, suggestive of reversible small airways obstruction. She reports improvement with albuterol.  ASSESSMENT:  Influenza A viral infection with reactive airway disease Seasonal allergic rhinitis GERD  PLAN: - Completed course of azithromycin followed by Augmentin and doxycycline - Start methylprednisolone dose pack - Start Symbicort 160-4.5 mcg 2 puffs twice daily, rinse after use - AirSupra (albuterol-budesonide) inhaler 1-2 puffs q6h PRN - Note that mild restriction seen on PFTs today may be due to recent viral infection - Will consider repeat PFTs in 6 months to assess for stability in restriction - Neti pot nasal saline irrigation twice daily followed by fluticasone - Increase fluticasone nasal spray to 2 sprays per nostril twice daily - Continue Loratadine 10 mg daily - Benzonatate 200 mg 2-3 times/day as necessary for cough or chest congestion - Mucinex DM  mg twice daily PRN - For costochondritis due to recent coughing, she can take acetaminophen PRN - Prefer to avoid ibuprofen given history of PUD/GERD - Consider lidocaine patch or diclofenac gel topically PRN - Follow-up in 1 week or sooner - Discussed with patient and daughter

## 2024-01-18 NOTE — HISTORY OF PRESENT ILLNESS
[Never] : never [TextBox_4] : Ms. Cosme is a 58 year-old female with a history of GERD, PUD, HLD, and seasonal allergies who presents for evaluation after recent ED visit on 1/7 for dry cough, fever, chills, wheezing, dyspnea, and sore throat that had started on 12/29. She was initially given azithromycin with improvement in fever but with persistent dry cough and development of left-sided chest pressure and headache. She was found to have normal CBC and CMP. CXR with clear lungs. Viral studies positive for influenza A. She was out of the window for oseltamivir, but was treated with doxycycline, Augmentin, and benzonatate. Currently, she continues to experience cough along with wheezing, chest tightness, and chest discomfort bilaterally with associated tenderness to palpation. She did not receive any steroids. She uses fluticasone nasal spray 2 sprays per nostril once daily along with Neti pot nasal saline irrigation and steam. She takes loratadine 10 mg daily. Myalgias have improved.  PFTs (Jan 2024) with mild restriction, low TLC with low normal RV, mildly reduced diffusing capacity. There is no BD response in FEV1 or FVC, but there is a BD response in [id number], suggestive of reversible small airways obstruction. She reports improvement with albuterol.

## 2024-01-26 ENCOUNTER — APPOINTMENT (OUTPATIENT)
Dept: PULMONOLOGY | Facility: CLINIC | Age: 59
End: 2024-01-26
Payer: MEDICAID

## 2024-01-26 VITALS
BODY MASS INDEX: 29.6 KG/M2 | WEIGHT: 173.4 LBS | SYSTOLIC BLOOD PRESSURE: 132 MMHG | TEMPERATURE: 97.6 F | OXYGEN SATURATION: 99 % | RESPIRATION RATE: 16 BRPM | HEIGHT: 64 IN | HEART RATE: 80 BPM | DIASTOLIC BLOOD PRESSURE: 84 MMHG

## 2024-01-26 DIAGNOSIS — J30.2 OTHER SEASONAL ALLERGIC RHINITIS: ICD-10-CM

## 2024-01-26 PROCEDURE — 99214 OFFICE O/P EST MOD 30 MIN: CPT

## 2024-01-26 RX ORDER — FLUTICASONE PROPIONATE 50 UG/1
50 SPRAY, METERED NASAL TWICE DAILY
Qty: 1 | Refills: 5 | Status: ACTIVE | COMMUNITY
Start: 2024-01-26 | End: 1900-01-01

## 2024-01-26 RX ORDER — BENZONATATE 200 MG/1
200 CAPSULE ORAL
Qty: 30 | Refills: 1 | Status: ACTIVE | COMMUNITY
Start: 2024-01-26 | End: 1900-01-01

## 2024-01-26 NOTE — PHYSICAL EXAM
[No Acute Distress] : no acute distress [Well Nourished] : well nourished [Well Developed] : well developed [Normal Oropharynx] : normal oropharynx [Normal Appearance] : normal appearance [Supple] : supple [No Neck Mass] : no neck mass [No JVD] : no jvd [Normal Rate/Rhythm] : normal rate/rhythm [Normal Pulses] : normal pulses [Normal S1, S2] : normal s1, s2 [No Murmurs] : no murmurs [No Resp Distress] : no resp distress [Clear to Auscultation Bilaterally] : clear to auscultation bilaterally [No Abnormalities] : no abnormalities [Benign] : benign [Normal Gait] : normal gait [No Clubbing] : no clubbing [No Cyanosis] : no cyanosis [No Edema] : no edema [Normal Color/ Pigmentation] : normal color/ pigmentation [FROM] : FROM [No Focal Deficits] : no focal deficits [Cranial Nerves Intact] : cranial nerves intact [Oriented x3] : oriented x3 [Normal Affect] : normal affect

## 2024-01-26 NOTE — HISTORY OF PRESENT ILLNESS
[Never] : never [TextBox_4] : Ms. Cosme is a 58 year-old female with a history of GERD, PUD, HLD, and seasonal allergies who presents for follow-up. She was recently in the ED on 1/7/24 with dry cough, fever, chills, wheezing, dyspnea, and sore throat that had started on 12/29. She was initially given azithromycin with improvement in fever but with persistent dry cough and development of left-sided chest pressure and headache. Labs with normal CBC and CMP. CXR with clear lungs. Viral studies positive for influenza A. She was out of the window for oseltamivir, but was treated with doxycycline, Augmentin, and benzonatate. She continues to experience cough, wheezing, chest tightness, and chest discomfort due to costochondritis. She completed a course of methylprednisolone with improvement, but also developed weakness and dizziness. She is using Symbicort twice daily and has not required to use AirSupra. Unfortunately, she developed another viral URI with development of nasal congestion and cough. She is also using fluticasone nasal spray twice daily, nasal saline irrigation, and loratadine 10 mg daily. Costochondritis pain improved. She did not require to use lidocaine patch or diclofenac gel. She is using benzonatate and Robitussin DM prn. She reports improved dyspnea, wheezing, and cough.  PFTs (Jan 2024) with mild restriction, low TLC with low normal RV, mildly reduced diffusing capacity. There is no BD response in FEV1 or FVC, but there is a BD response in [id number], suggestive of reversible small airways obstruction. She reports improvement with albuterol.

## 2024-01-26 NOTE — ASSESSMENT
[FreeTextEntry1] : -  Ms. Cosme is a 58 year-old female with a history of GERD, PUD, HLD, and seasonal allergies who presents for follow-up. She was recently in the ED on 1/7/24 with dry cough, fever, chills, wheezing, dyspnea, and sore throat that had started on 12/29. She was initially given azithromycin with improvement in fever but with persistent dry cough and development of left-sided chest pressure and headache. Labs with normal CBC and CMP. CXR with clear lungs. Viral studies positive for influenza A. She was out of the window for oseltamivir, but was treated with doxycycline, Augmentin, and benzonatate. She continues to experience cough, wheezing, chest tightness, and chest discomfort due to costochondritis. She completed a course of methylprednisolone with improvement, but also developed weakness and dizziness. She is using Symbicort twice daily and has not required to use AirSupra. Unfortunately, she developed another viral URI with development of nasal congestion and cough. She is also using fluticasone nasal spray twice daily, nasal saline irrigation, and loratadine 10 mg daily. Costochondritis pain improved. She did not require to use lidocaine patch or diclofenac gel. She is using benzonatate and Robitussin DM prn. She reports improved dyspnea, wheezing, and cough.  PFTs (Jan 2024) with mild restriction, low TLC with low normal RV, mildly reduced diffusing capacity. There is no BD response in FEV1 or FVC, but there is a BD response in [id number], suggestive of reversible small airways obstruction. She reports improvement with albuterol.  ASSESSMENT:  Influenza A viral infection with reactive airway disease Seasonal allergic rhinitis GERD  PLAN: - Completed courses of azithromycin, Augmentin, and doxycycline - Completed methylprednisolone dose pack - Continue Symbicort 2 puffs twice daily for 1 week, then 1 puff twice daily, then PRN - AirSupra (albuterol-budesonide) inhaler 1-2 puffs q6h PRN - Monitor for asthma symptoms, including wheezing, chest tightness, cough, dyspnea, or nocturnal - Neti pot nasal saline irrigation 1-2x/day - Fluticasone nasal spray 2 sprays per nostril twice daily - Loratadine 10 mg daily - Benzonatate 200 mg 2-3 times/day as necessary for cough or chest congestion - Mucinex DM  mg twice daily PRN - Repeat PFTs with albuterol testing in May/June with next follow-up to evaluate for stability/improvement of pulmonary restriction - Influenza vaccine when clinically improved - Consider lidocaine patch or diclofenac gel topically PRN if she develops costochondritic chest pain - Follow-up in 3 months or sooner prn

## 2024-06-06 ENCOUNTER — APPOINTMENT (OUTPATIENT)
Dept: PULMONOLOGY | Facility: CLINIC | Age: 59
End: 2024-06-06

## 2024-06-12 PROBLEM — E78.5 HYPERLIPIDEMIA, UNSPECIFIED: Chronic | Status: ACTIVE | Noted: 2024-01-07

## 2024-06-12 PROBLEM — K29.70 GASTRITIS, UNSPECIFIED, WITHOUT BLEEDING: Chronic | Status: ACTIVE | Noted: 2024-01-07

## 2024-06-20 ENCOUNTER — NON-APPOINTMENT (OUTPATIENT)
Age: 59
End: 2024-06-20

## 2024-06-20 ENCOUNTER — APPOINTMENT (OUTPATIENT)
Dept: OTOLARYNGOLOGY | Facility: CLINIC | Age: 59
End: 2024-06-20
Payer: MEDICAID

## 2024-06-20 VITALS
HEIGHT: 64 IN | BODY MASS INDEX: 29.53 KG/M2 | SYSTOLIC BLOOD PRESSURE: 132 MMHG | HEART RATE: 67 BPM | WEIGHT: 173 LBS | DIASTOLIC BLOOD PRESSURE: 88 MMHG

## 2024-06-20 DIAGNOSIS — H90.71 MIXED CONDUCTIVE AND SENSORINEURAL HEARING LOSS, UNILATERAL, RIGHT EAR, WITH UNRESTRICTED HEARING ON THE CONTRALATERAL SIDE: ICD-10-CM

## 2024-06-20 DIAGNOSIS — J34.2 DEVIATED NASAL SEPTUM: ICD-10-CM

## 2024-06-20 DIAGNOSIS — J31.0 CHRONIC RHINITIS: ICD-10-CM

## 2024-06-20 DIAGNOSIS — J32.9 CHRONIC SINUSITIS, UNSPECIFIED: ICD-10-CM

## 2024-06-20 PROCEDURE — 31231 NASAL ENDOSCOPY DX: CPT

## 2024-06-20 PROCEDURE — 92567 TYMPANOMETRY: CPT

## 2024-06-20 PROCEDURE — 99204 OFFICE O/P NEW MOD 45 MIN: CPT | Mod: 25

## 2024-06-20 PROCEDURE — 92557 COMPREHENSIVE HEARING TEST: CPT

## 2024-06-20 RX ORDER — AMOXICILLIN AND CLAVULANATE POTASSIUM 875; 125 MG/1; MG/1
875-125 TABLET, COATED ORAL
Qty: 20 | Refills: 0 | Status: ACTIVE | COMMUNITY
Start: 2024-06-20 | End: 1900-01-01

## 2024-06-20 RX ORDER — FLUTICASONE PROPIONATE 50 UG/1
50 SPRAY, METERED NASAL TWICE DAILY
Qty: 1 | Refills: 1 | Status: ACTIVE | COMMUNITY
Start: 2024-06-20 | End: 1900-01-01

## 2024-06-20 NOTE — REVIEW OF SYSTEMS
[Sneezing] : sneezing [Seasonal Allergies] : seasonal allergies [Ear Pain] : ear pain [Ear Itch] : ear itch [Hearing Loss] : hearing loss [Ear Noises] : ear noises [Nasal Congestion] : nasal congestion [Recurrent Sinus Infections] : recurrent sinus infections [Problem Snoring] : problem snoring [Sinus Pain] : sinus pain [Sinus Pressure] : sinus pressure [Sense Of Smell Problem] : sense of smell problem [Eye Pain] : eye pain [Heartburn] : heartburn [Negative] : Heme/Lymph [FreeTextEntry7] : reflux [de-identified] : headache [FreeTextEntry1] : fatigue

## 2024-06-20 NOTE — HISTORY OF PRESENT ILLNESS
[de-identified] : Tish Cosme is a 57 yo female who presents for evaluation of hearing. She notes right sided hearing loss since 2007. She went to outside hospital at that time and had imaging, but unsure of results. She went to outside ENT in 2018 who noted hearing loss. She notes gradual hearing loss over time in the left ear. She notes intermittent right nonpulsatile tinnitus. She notes intermittent right otalgia. She denies otorrhea or vertigo. She denies recent fevers or chills. She notes that her cousin had hearing loss at a younger age. She denies exposure to ototoxic medications, head trauma, or loud noise exposure.   She has had chronic sinonasal symptoms over the past ten years. She notes chronic sinus pressure. She has chronic nasal congestion and postnasal drainage. She denies rhinorrhea. She denies vision changes or pain/restriction of extraocular movements. She notes 1-2 sinus infections per year. She is using sinus rinses, flonase, robitussin. She notes previous blood allergy testing and this was negative per her report.

## 2024-06-20 NOTE — ASSESSMENT
[FreeTextEntry1] : Jung Cosme presents for evaluation. She has history of chronic sinusitis and chronic rhinitis. Sinonasal endoscopy was performed showing septal deviation. We will start maximal medical therapy for sinusitis then obtain CT sinus for possible surgical planning.  She also has chronic hearing loss since 2007. Otoscopic exam today is normal. Audiogram was performed and reviewed showing type A tymps AU, moderate rising to mild mixed hearing loss AD, and hearing wnl AS. Her audiogram is suspicious for otosclerosis. Will obtain Ct temporal bone.  - start augmentin x 10 days. Side effects were discussed and include but are not limited to nausea, vomiting, diarrhea, and skin rash. - sinus rinses BID x 3 weeks. - start fluticasone 2 sprays BID x 3 weeks. - CT sinus and CT temporal bone in 3 weeks. - f/u after CT

## 2024-06-20 NOTE — DATA REVIEWED
[de-identified] : Audio 6/20/24: Type A tymps AU. AD - moderate rising to mild mixed hearing loss AS - hearing wnl

## 2024-07-18 ENCOUNTER — OUTPATIENT (OUTPATIENT)
Dept: OUTPATIENT SERVICES | Facility: HOSPITAL | Age: 59
LOS: 1 days | End: 2024-07-18
Payer: MEDICAID

## 2024-07-18 ENCOUNTER — APPOINTMENT (OUTPATIENT)
Dept: CT IMAGING | Facility: CLINIC | Age: 59
End: 2024-07-18
Payer: MEDICAID

## 2024-07-18 DIAGNOSIS — D25.9 LEIOMYOMA OF UTERUS, UNSPECIFIED: Chronic | ICD-10-CM

## 2024-07-18 DIAGNOSIS — Z87.59 PERSONAL HISTORY OF OTHER COMPLICATIONS OF PREGNANCY, CHILDBIRTH AND THE PUERPERIUM: Chronic | ICD-10-CM

## 2024-07-18 DIAGNOSIS — Z98.890 OTHER SPECIFIED POSTPROCEDURAL STATES: Chronic | ICD-10-CM

## 2024-07-18 DIAGNOSIS — H90.71 MIXED CONDUCTIVE AND SENSORINEURAL HEARING LOSS, UNILATERAL, RIGHT EAR, WITH UNRESTRICTED HEARING ON THE CONTRALATERAL SIDE: ICD-10-CM

## 2024-07-18 DIAGNOSIS — J32.9 CHRONIC SINUSITIS, UNSPECIFIED: ICD-10-CM

## 2024-07-18 DIAGNOSIS — O00.109 UNSPECIFIED TUBAL PREGNANCY WITHOUT INTRAUTERINE PREGNANCY: Chronic | ICD-10-CM

## 2024-07-18 PROCEDURE — 70480 CT ORBIT/EAR/FOSSA W/O DYE: CPT | Mod: 26

## 2024-07-18 PROCEDURE — 70486 CT MAXILLOFACIAL W/O DYE: CPT | Mod: 26

## 2024-07-18 PROCEDURE — 70486 CT MAXILLOFACIAL W/O DYE: CPT

## 2024-07-18 PROCEDURE — 70480 CT ORBIT/EAR/FOSSA W/O DYE: CPT

## 2024-08-07 ENCOUNTER — APPOINTMENT (OUTPATIENT)
Dept: OTOLARYNGOLOGY | Facility: CLINIC | Age: 59
End: 2024-08-07

## 2024-08-07 PROBLEM — M95.0 NASAL VALVE COLLAPSE: Status: ACTIVE | Noted: 2024-08-07

## 2024-08-07 PROCEDURE — 99214 OFFICE O/P EST MOD 30 MIN: CPT

## 2024-08-07 NOTE — PHYSICAL EXAM
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 [de-identified] : Bilateral inferior turbinate hypertrophy.

## 2024-08-07 NOTE — ASSESSMENT
[FreeTextEntry1] : Jung Cosme presents for follow-up. She has history of chronic sinusitis and chronic rhinitis. Sinonasal endoscopy at last visit showed septal deviation. She completed maximal medical therapy for sinusitis. She notes continued issues with nasal congestion and sinus pressure. CT sinus was reviewed showing mild bilateral septal deviation, left moris bullosa, and no significant sinus disease. 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 We discussed bilateral Vivaer procedure for nasal valve repair and inferior turbinate reduction. R/b/a discussed. Risks include but are not limited to bleeding, infection, crusting, scarring, injury to nasal mucosa, nasal swelling, persistence of symptoms, need for future surgery. All questions were answered. She will consider surgery.  She also has chronic hearing loss since 2007. Otoscopic exam is normal. Audiogram was performed and reviewed at last visit showing type A tymps AU, moderate rising to mild mixed hearing loss AD, and hearing wnl AS. Her audiogram is suspicious for otosclerosis. CT IAC was normal. Will refer her to Dr. Lockhart for further evaluation.  - sinus rinses BID prn - fluticasone 2 sprays BID prn - refer to Dr. Lockhart - f/u in 3 mo

## 2024-08-07 NOTE — HISTORY OF PRESENT ILLNESS
[de-identified] : Tish Cosme is a 57 yo female who presents for evaluation of hearing. She notes right sided hearing loss since 2007. She went to outside hospital at that time and had imaging, but unsure of results. She went to outside ENT in 2018 who noted hearing loss. She notes gradual hearing loss over time in the left ear. She notes intermittent right nonpulsatile tinnitus. She notes intermittent right otalgia. She denies otorrhea or vertigo. She denies recent fevers or chills. She notes that her cousin had hearing loss at a younger age. She denies exposure to ototoxic medications, head trauma, or loud noise exposure.   She has had chronic sinonasal symptoms over the past ten years. She notes chronic sinus pressure. She has chronic nasal congestion and postnasal drainage. She denies rhinorrhea. She denies vision changes or pain/restriction of extraocular movements. She notes 1-2 sinus infections per year. She is using sinus rinses, flonase, robitussin. She notes previous blood allergy testing and this was negative per her report. [FreeTextEntry1] : 8/7/24 - Ms. Cosme presents for follow-up. She notes no change in hearing. She completed maximal medical therapy for sinusitis. She notes after stopping antibiotics, she developed intermittent nasal congestion, sinus pressure, and postnasal drainage. No fevers.

## 2024-08-27 NOTE — DATA REVIEWED
[de-identified] : CT sinus 7/18/24: Findings:  FRONTAL SINUSES: The frontal sinuses are well-aerated.. The frontal sinus outflow tracts are patent.  ETHMOID SINUSES: Mild mucosal thickening ethmoidal sinuses.  SPHENOID SINUSES: The sphenoid sinuses are well-aerated. The sphenoethmoidal recesses are free of abnormal soft tissue bilaterally. There is protrusion of carotid canals into sphenoid sinuses with grossly intact bony coverage.  MAXILLARY SINUSES: The maxillary sinuses are well clear. The osteomeatal units are free of mucosal disease.  SINOCRANIAL AND SINOORBITAL JUNCTIONS: The bones adjacent to the sinuses, including the lamina papyracea, cribriform plates and fovea ethmoidalis, are grossly intact.  NASAL SEPTUM AND NASAL CAVITY: Sigmoid type septal septal deviation with bony spur. Moderate size left moris bullosa. No polypoid lesions in the nasal fossa.  OTHER: Intraorbital content is unremarkable. 4 mm nodular skin lesion lesion in the left cheek (6:120) can be correlated with direct visualization.  IMPRESSION:  No significant mucosal abnormality.  Nasal septal deviation. No polyps in the nasal fossa.   CT IAC 7/18/24: FINDINGS:  RIGHT:The external auditory canal is unremarkable. Tympanic membrane is not thickened. The mastoid air cells and middle ear cavity are well-aerated and clear. Hyperpneumatization of mastoid. There is bony thinning of the tympanic and mastoid tegmen without apparent bony defect . Bony coverage of superior semicircular canal is intact. Ossicular chain is intact. Normal ossification of the otic capsule.No appreciable demineralization in the fissula ante fenestrum. The facial nerve described normal course. The inner ear structures are normally formed. The internal auditory canal is normal in size. Normal bony coverage of jugular bulb and carotid canal. High riding jugular bulb.  LEFT:The external auditory canal is unremarkable. Tympanic membrane is not thickened. The mastoid air cells and middle ear cavity are well-aerated and clear. Hyperpneumatization of mastoid. There is bony thinning of the tympanic and mastoid tegmen without apparent bony defect . Bony coverage of superior semicircular canal is intact. Ossicular chain is intact. Normal ossification of the otic capsule. No appreciable demineralization in the fissula ante fenestrum. The facial nerve described normal course. The inner ear structures are normally formed. The internal auditory canal is normal in size. Normal bony coverage of jugular bulb and carotid canal.  IMPRESSION:  RIGHT: No appreciable bony demineralization, erosive changes or soft tissue lesions.  LEFT: No appreciable bony demineralization, erosive changes or soft tissue lesions. Additional Notes: Extraction with 16g needle and used high temp Ravindra cautery Detail Level: Zone Render Risk Assessment In Note?: no Additional Notes: High temp Ravindra Cautery Detail Level: Detailed

## 2024-10-03 ENCOUNTER — NON-APPOINTMENT (OUTPATIENT)
Age: 59
End: 2024-10-03

## 2024-10-22 ENCOUNTER — NON-APPOINTMENT (OUTPATIENT)
Age: 59
End: 2024-10-22

## 2024-11-04 ENCOUNTER — APPOINTMENT (OUTPATIENT)
Dept: PULMONOLOGY | Facility: CLINIC | Age: 59
End: 2024-11-04
Payer: MEDICAID

## 2024-11-04 DIAGNOSIS — J45.30 MILD PERSISTENT ASTHMA, UNCOMPLICATED: ICD-10-CM

## 2024-11-04 PROCEDURE — G2211 COMPLEX E/M VISIT ADD ON: CPT | Mod: NC,95

## 2024-11-04 PROCEDURE — 99215 OFFICE O/P EST HI 40 MIN: CPT | Mod: 95

## 2024-11-15 ENCOUNTER — APPOINTMENT (OUTPATIENT)
Dept: OTOLARYNGOLOGY | Facility: CLINIC | Age: 59
End: 2024-11-15

## 2024-11-30 NOTE — QUALITY MEASURES
Spirometry documented and] : spirometr documented and reviewed in record

## 2025-01-02 ENCOUNTER — NON-APPOINTMENT (OUTPATIENT)
Age: 60
End: 2025-01-02

## 2025-01-09 ENCOUNTER — APPOINTMENT (OUTPATIENT)
Dept: PULMONOLOGY | Facility: CLINIC | Age: 60
End: 2025-01-09
Payer: MEDICAID

## 2025-01-09 ENCOUNTER — LABORATORY RESULT (OUTPATIENT)
Age: 60
End: 2025-01-09

## 2025-01-09 VITALS
HEART RATE: 78 BPM | DIASTOLIC BLOOD PRESSURE: 81 MMHG | WEIGHT: 175 LBS | OXYGEN SATURATION: 99 % | SYSTOLIC BLOOD PRESSURE: 149 MMHG | HEIGHT: 64 IN | BODY MASS INDEX: 29.88 KG/M2

## 2025-01-09 DIAGNOSIS — R05.3 CHRONIC COUGH: ICD-10-CM

## 2025-01-09 DIAGNOSIS — J45.30 MILD PERSISTENT ASTHMA, UNCOMPLICATED: ICD-10-CM

## 2025-01-09 DIAGNOSIS — Z23 ENCOUNTER FOR IMMUNIZATION: ICD-10-CM

## 2025-01-09 DIAGNOSIS — J30.2 OTHER SEASONAL ALLERGIC RHINITIS: ICD-10-CM

## 2025-01-09 PROCEDURE — 94060 EVALUATION OF WHEEZING: CPT

## 2025-01-09 PROCEDURE — 95012 NITRIC OXIDE EXP GAS DETER: CPT

## 2025-01-09 PROCEDURE — 99215 OFFICE O/P EST HI 40 MIN: CPT | Mod: 25

## 2025-01-09 PROCEDURE — 94729 DIFFUSING CAPACITY: CPT

## 2025-01-09 PROCEDURE — 94726 PLETHYSMOGRAPHY LUNG VOLUMES: CPT

## 2025-01-09 PROCEDURE — ZZZZZ: CPT

## 2025-01-09 RX ORDER — BENZONATATE 200 MG/1
200 CAPSULE ORAL
Qty: 60 | Refills: 1 | Status: ACTIVE | COMMUNITY
Start: 2025-01-09 | End: 1900-01-01

## 2025-01-09 RX ORDER — AZELASTINE HYDROCHLORIDE 137 UG/1
0.1 SPRAY, METERED NASAL
Qty: 90 | Refills: 3 | Status: ACTIVE | COMMUNITY
Start: 2025-01-09 | End: 1900-01-01

## 2025-01-12 LAB
A ALTERNATA IGE QN: <0.1 KUA/L
A FUMIGATUS IGE QN: <0.1 KUA/L
BASOPHILS # BLD AUTO: 0.04 K/UL
BASOPHILS NFR BLD AUTO: 0.6 %
C ALBICANS IGE QN: <0.1 KUA/L
C HERBARUM IGE QN: <0.1 KUA/L
CAT DANDER IGE QN: <0.1 KUA/L
COMMON RAGWEED IGE QN: <0.1 KUA/L
D FARINAE IGE QN: <0.1 KUA/L
D PTERONYSS IGE QN: <0.1 KUA/L
DEPRECATED A ALTERNATA IGE RAST QL: 0
DEPRECATED A FUMIGATUS IGE RAST QL: 0
DEPRECATED C ALBICANS IGE RAST QL: 0
DEPRECATED C HERBARUM IGE RAST QL: 0
DEPRECATED CAT DANDER IGE RAST QL: 0
DEPRECATED COMMON RAGWEED IGE RAST QL: 0
DEPRECATED D FARINAE IGE RAST QL: 0
DEPRECATED D PTERONYSS IGE RAST QL: 0
DEPRECATED DOG DANDER IGE RAST QL: 0
DEPRECATED M RACEMOSUS IGE RAST QL: 0
DEPRECATED ROACH IGE RAST QL: NORMAL
DEPRECATED TIMOTHY IGE RAST QL: 0
DEPRECATED WHITE OAK IGE RAST QL: 0
DOG DANDER IGE QN: <0.1 KUA/L
EOSINOPHIL # BLD AUTO: 0.08 K/UL
EOSINOPHIL NFR BLD AUTO: 1.1 %
HCT VFR BLD CALC: 39.9 %
HGB BLD-MCNC: 12.8 G/DL
IGE SER-MCNC: 31 KU/L
IMM GRANULOCYTES NFR BLD AUTO: 0.1 %
LYMPHOCYTES # BLD AUTO: 1.73 K/UL
LYMPHOCYTES NFR BLD AUTO: 24.6 %
M RACEMOSUS IGE QN: <0.1 KUA/L
MAN DIFF?: NORMAL
MCHC RBC-ENTMCNC: 26.8 PG
MCHC RBC-ENTMCNC: 32.1 G/DL
MCV RBC AUTO: 83.6 FL
MONOCYTES # BLD AUTO: 0.35 K/UL
MONOCYTES NFR BLD AUTO: 5 %
NEUTROPHILS # BLD AUTO: 4.82 K/UL
NEUTROPHILS NFR BLD AUTO: 68.6 %
PLATELET # BLD AUTO: 245 K/UL
RBC # BLD: 4.77 M/UL
RBC # FLD: 12.6 %
ROACH IGE QN: 0.15 KUA/L
TIMOTHY IGE QN: <0.1 KUA/L
WBC # FLD AUTO: 7.03 K/UL
WHITE OAK IGE QN: <0.1 KUA/L

## 2025-01-16 ENCOUNTER — OUTPATIENT (OUTPATIENT)
Dept: OUTPATIENT SERVICES | Facility: HOSPITAL | Age: 60
LOS: 1 days | End: 2025-01-16
Payer: MEDICAID

## 2025-01-16 ENCOUNTER — APPOINTMENT (OUTPATIENT)
Dept: CT IMAGING | Facility: CLINIC | Age: 60
End: 2025-01-16
Payer: MEDICAID

## 2025-01-16 DIAGNOSIS — D25.9 LEIOMYOMA OF UTERUS, UNSPECIFIED: Chronic | ICD-10-CM

## 2025-01-16 DIAGNOSIS — Z87.59 PERSONAL HISTORY OF OTHER COMPLICATIONS OF PREGNANCY, CHILDBIRTH AND THE PUERPERIUM: Chronic | ICD-10-CM

## 2025-01-16 DIAGNOSIS — O00.109 UNSPECIFIED TUBAL PREGNANCY WITHOUT INTRAUTERINE PREGNANCY: Chronic | ICD-10-CM

## 2025-01-16 DIAGNOSIS — R05.3 CHRONIC COUGH: ICD-10-CM

## 2025-01-16 DIAGNOSIS — J45.30 MILD PERSISTENT ASTHMA, UNCOMPLICATED: ICD-10-CM

## 2025-01-16 DIAGNOSIS — Z98.890 OTHER SPECIFIED POSTPROCEDURAL STATES: Chronic | ICD-10-CM

## 2025-01-16 PROCEDURE — 71250 CT THORAX DX C-: CPT

## 2025-01-16 PROCEDURE — 71250 CT THORAX DX C-: CPT | Mod: 26

## 2025-01-17 LAB
A FLAVUS AB FLD QL: NEGATIVE
A FUMIGATUS AB FLD QL: NEGATIVE
A NIGER AB FLD QL: NEGATIVE
ANNOTATION COMMENT IMP: NORMAL
ELECTRONIC SIGNATURE: NORMAL
SERPINA1 GENE MUT TESTED BLD/T: NORMAL

## 2025-01-23 PROBLEM — R91.1 LUNG NODULE: Status: ACTIVE | Noted: 2025-01-23

## 2025-02-04 ENCOUNTER — EMERGENCY (EMERGENCY)
Facility: HOSPITAL | Age: 60
LOS: 1 days | Discharge: HOME | End: 2025-02-04
Attending: EMERGENCY MEDICINE | Admitting: STUDENT IN AN ORGANIZED HEALTH CARE EDUCATION/TRAINING PROGRAM
Payer: MEDICAID

## 2025-02-04 ENCOUNTER — APPOINTMENT (OUTPATIENT)
Dept: PULMONOLOGY | Facility: CLINIC | Age: 60
End: 2025-02-04
Payer: MEDICAID

## 2025-02-04 VITALS
HEART RATE: 67 BPM | SYSTOLIC BLOOD PRESSURE: 205 MMHG | DIASTOLIC BLOOD PRESSURE: 93 MMHG | WEIGHT: 171.96 LBS | TEMPERATURE: 98 F | RESPIRATION RATE: 16 BRPM | OXYGEN SATURATION: 98 % | HEIGHT: 64 IN

## 2025-02-04 VITALS
SYSTOLIC BLOOD PRESSURE: 192 MMHG | HEIGHT: 64 IN | OXYGEN SATURATION: 100 % | HEART RATE: 78 BPM | BODY MASS INDEX: 29.88 KG/M2 | DIASTOLIC BLOOD PRESSURE: 100 MMHG | WEIGHT: 175 LBS

## 2025-02-04 DIAGNOSIS — Z98.890 OTHER SPECIFIED POSTPROCEDURAL STATES: Chronic | ICD-10-CM

## 2025-02-04 DIAGNOSIS — Z87.59 PERSONAL HISTORY OF OTHER COMPLICATIONS OF PREGNANCY, CHILDBIRTH AND THE PUERPERIUM: Chronic | ICD-10-CM

## 2025-02-04 DIAGNOSIS — D25.9 LEIOMYOMA OF UTERUS, UNSPECIFIED: Chronic | ICD-10-CM

## 2025-02-04 DIAGNOSIS — O00.109 UNSPECIFIED TUBAL PREGNANCY WITHOUT INTRAUTERINE PREGNANCY: Chronic | ICD-10-CM

## 2025-02-04 LAB
ADD ON TEST-SPECIMEN IN LAB: SIGNIFICANT CHANGE UP
ALBUMIN SERPL ELPH-MCNC: 4.1 G/DL — SIGNIFICANT CHANGE UP (ref 3.3–5)
ALP SERPL-CCNC: 93 U/L — SIGNIFICANT CHANGE UP (ref 40–120)
ALT FLD-CCNC: 12 U/L — SIGNIFICANT CHANGE UP (ref 4–33)
ANION GAP SERPL CALC-SCNC: 15 MMOL/L — HIGH (ref 7–14)
APTT BLD: 34.7 SEC — SIGNIFICANT CHANGE UP (ref 24.5–35.6)
AST SERPL-CCNC: 17 U/L — SIGNIFICANT CHANGE UP (ref 4–32)
BASOPHILS # BLD AUTO: 0.05 K/UL — SIGNIFICANT CHANGE UP (ref 0–0.2)
BASOPHILS NFR BLD AUTO: 0.8 % — SIGNIFICANT CHANGE UP (ref 0–2)
BILIRUB SERPL-MCNC: 0.4 MG/DL — SIGNIFICANT CHANGE UP (ref 0.2–1.2)
BUN SERPL-MCNC: 9 MG/DL — SIGNIFICANT CHANGE UP (ref 7–23)
CALCIUM SERPL-MCNC: 9.9 MG/DL — SIGNIFICANT CHANGE UP (ref 8.4–10.5)
CHLORIDE SERPL-SCNC: 105 MMOL/L — SIGNIFICANT CHANGE UP (ref 98–107)
CO2 SERPL-SCNC: 17 MMOL/L — LOW (ref 22–31)
CREAT SERPL-MCNC: 0.58 MG/DL — SIGNIFICANT CHANGE UP (ref 0.5–1.3)
EGFR: 104 ML/MIN/1.73M2 — SIGNIFICANT CHANGE UP
EOSINOPHIL # BLD AUTO: 0.06 K/UL — SIGNIFICANT CHANGE UP (ref 0–0.5)
EOSINOPHIL NFR BLD AUTO: 1 % — SIGNIFICANT CHANGE UP (ref 0–6)
FLUAV AG NPH QL: SIGNIFICANT CHANGE UP
FLUBV AG NPH QL: SIGNIFICANT CHANGE UP
GAS PNL BLDV: SIGNIFICANT CHANGE UP
GLUCOSE SERPL-MCNC: 87 MG/DL — SIGNIFICANT CHANGE UP (ref 70–99)
HCT VFR BLD CALC: 37 % — SIGNIFICANT CHANGE UP (ref 34.5–45)
HGB BLD-MCNC: 12.4 G/DL — SIGNIFICANT CHANGE UP (ref 11.5–15.5)
IANC: 3.95 K/UL — SIGNIFICANT CHANGE UP (ref 1.8–7.4)
IMM GRANULOCYTES NFR BLD AUTO: 0.3 % — SIGNIFICANT CHANGE UP (ref 0–0.9)
INR BLD: 1.03 RATIO — SIGNIFICANT CHANGE UP (ref 0.85–1.16)
LYMPHOCYTES # BLD AUTO: 1.67 K/UL — SIGNIFICANT CHANGE UP (ref 1–3.3)
LYMPHOCYTES # BLD AUTO: 27.3 % — SIGNIFICANT CHANGE UP (ref 13–44)
MAGNESIUM SERPL-MCNC: 2 MG/DL — SIGNIFICANT CHANGE UP (ref 1.6–2.6)
MCHC RBC-ENTMCNC: 27.3 PG — SIGNIFICANT CHANGE UP (ref 27–34)
MCHC RBC-ENTMCNC: 33.5 G/DL — SIGNIFICANT CHANGE UP (ref 32–36)
MCV RBC AUTO: 81.3 FL — SIGNIFICANT CHANGE UP (ref 80–100)
MONOCYTES # BLD AUTO: 0.36 K/UL — SIGNIFICANT CHANGE UP (ref 0–0.9)
MONOCYTES NFR BLD AUTO: 5.9 % — SIGNIFICANT CHANGE UP (ref 2–14)
NEUTROPHILS # BLD AUTO: 3.95 K/UL — SIGNIFICANT CHANGE UP (ref 1.8–7.4)
NEUTROPHILS NFR BLD AUTO: 64.7 % — SIGNIFICANT CHANGE UP (ref 43–77)
NRBC # BLD AUTO: 0 K/UL — SIGNIFICANT CHANGE UP (ref 0–0)
NRBC # BLD: 0 /100 WBCS — SIGNIFICANT CHANGE UP (ref 0–0)
NRBC # FLD: 0 K/UL — SIGNIFICANT CHANGE UP (ref 0–0)
NRBC BLD-RTO: 0 /100 WBCS — SIGNIFICANT CHANGE UP (ref 0–0)
NT-PROBNP SERPL-SCNC: 109 PG/ML — SIGNIFICANT CHANGE UP
PHOSPHATE SERPL-MCNC: 3.8 MG/DL — SIGNIFICANT CHANGE UP (ref 2.5–4.5)
PLATELET # BLD AUTO: 218 K/UL — SIGNIFICANT CHANGE UP (ref 150–400)
POTASSIUM SERPL-MCNC: 4.5 MMOL/L — SIGNIFICANT CHANGE UP (ref 3.5–5.3)
POTASSIUM SERPL-SCNC: 4.5 MMOL/L — SIGNIFICANT CHANGE UP (ref 3.5–5.3)
PROT SERPL-MCNC: 7.9 G/DL — SIGNIFICANT CHANGE UP (ref 6–8.3)
PROTHROM AB SERPL-ACNC: 12 SEC — SIGNIFICANT CHANGE UP (ref 9.9–13.4)
RBC # BLD: 4.55 M/UL — SIGNIFICANT CHANGE UP (ref 3.8–5.2)
RBC # FLD: 12.2 % — SIGNIFICANT CHANGE UP (ref 10.3–14.5)
RSV RNA NPH QL NAA+NON-PROBE: SIGNIFICANT CHANGE UP
SARS-COV-2 RNA SPEC QL NAA+PROBE: SIGNIFICANT CHANGE UP
SODIUM SERPL-SCNC: 137 MMOL/L — SIGNIFICANT CHANGE UP (ref 135–145)
TROPONIN T, HIGH SENSITIVITY RESULT: 13 NG/L — SIGNIFICANT CHANGE UP
TROPONIN T, HIGH SENSITIVITY RESULT: 19 NG/L — SIGNIFICANT CHANGE UP
WBC # BLD: 6.11 K/UL — SIGNIFICANT CHANGE UP (ref 3.8–10.5)
WBC # FLD AUTO: 6.11 K/UL — SIGNIFICANT CHANGE UP (ref 3.8–10.5)

## 2025-02-04 PROCEDURE — ZZZZZ: CPT

## 2025-02-04 PROCEDURE — 93010 ELECTROCARDIOGRAM REPORT: CPT

## 2025-02-04 PROCEDURE — 71046 X-RAY EXAM CHEST 2 VIEWS: CPT | Mod: 26

## 2025-02-04 PROCEDURE — 99223 1ST HOSP IP/OBS HIGH 75: CPT

## 2025-02-04 RX ORDER — ATORVASTATIN CALCIUM 80 MG/1
20 TABLET, FILM COATED ORAL AT BEDTIME
Refills: 0 | Status: DISCONTINUED | OUTPATIENT
Start: 2025-02-04 | End: 2025-02-04

## 2025-02-04 RX ORDER — FLUTICASONE PROPIONATE 50 UG/1
1 SPRAY, METERED NASAL
Refills: 0 | Status: ACTIVE | OUTPATIENT
Start: 2025-02-04 | End: 2026-01-03

## 2025-02-04 RX ORDER — KETOROLAC TROMETHAMINE 10 MG
15 TABLET ORAL ONCE
Refills: 0 | Status: DISCONTINUED | OUTPATIENT
Start: 2025-02-04 | End: 2025-02-04

## 2025-02-04 RX ORDER — ROSUVASTATIN CALCIUM 10 MG/1
10 TABLET, FILM COATED ORAL AT BEDTIME
Refills: 0 | Status: ACTIVE | OUTPATIENT
Start: 2025-02-04 | End: 2026-01-03

## 2025-02-04 RX ORDER — FAMOTIDINE 10 MG/ML
20 INJECTION INTRAVENOUS
Refills: 0 | Status: ACTIVE | OUTPATIENT
Start: 2025-02-04 | End: 2026-01-03

## 2025-02-04 RX ORDER — CHLORTHALIDONE 100 MG
25 TABLET ORAL DAILY
Refills: 0 | Status: ACTIVE | OUTPATIENT
Start: 2025-02-05 | End: 2026-01-04

## 2025-02-04 RX ORDER — FLUTICASONE PROPIONATE AND SALMETEROL 113; 14 UG/1; UG/1
1 POWDER, METERED RESPIRATORY (INHALATION)
Refills: 0 | Status: ACTIVE | OUTPATIENT
Start: 2025-02-04 | End: 2026-01-03

## 2025-02-04 RX ORDER — CHLORTHALIDONE 100 MG
25 TABLET ORAL ONCE
Refills: 0 | Status: COMPLETED | OUTPATIENT
Start: 2025-02-04 | End: 2025-02-04

## 2025-02-04 RX ORDER — CETIRIZINE HCL 10 MG
10 TABLET ORAL DAILY
Refills: 0 | Status: ACTIVE | OUTPATIENT
Start: 2025-02-04 | End: 2026-01-03

## 2025-02-04 RX ORDER — ACETAMINOPHEN 160 MG/5ML
1000 SUSPENSION ORAL ONCE
Refills: 0 | Status: COMPLETED | OUTPATIENT
Start: 2025-02-04 | End: 2025-02-04

## 2025-02-04 RX ADMIN — Medication 25 MILLIGRAM(S): at 17:34

## 2025-02-04 RX ADMIN — FLUTICASONE PROPIONATE 1 SPRAY(S): 50 SPRAY, METERED NASAL at 23:11

## 2025-02-04 RX ADMIN — ACETAMINOPHEN 400 MILLIGRAM(S): 160 SUSPENSION ORAL at 17:09

## 2025-02-04 RX ADMIN — FAMOTIDINE 20 MILLIGRAM(S): 10 INJECTION INTRAVENOUS at 22:33

## 2025-02-04 RX ADMIN — ROSUVASTATIN CALCIUM 10 MILLIGRAM(S): 10 TABLET, FILM COATED ORAL at 23:11

## 2025-02-04 NOTE — ED CDU PROVIDER INITIAL DAY NOTE - OBJECTIVE STATEMENT
59-year-old female with past medical history of GERD, peptic ulcer disease, hyperlipidemia presents emergency department for episode of hypertension with associated lightheadedness and chest tightness today while at outpatient appointment.  Per daughter at bedside, the patient has been seeing pulmonology for evaluation as she has had 2 episodes of pneumonia within the past year.  She was having a methacholine challenge test today, when she was found to be hypertensive to the 200s over 110 range.  The patient does not currently take medication for her hypertension.  Patient endorses over the past few months she will have intermittent left-sided chest tightness.  Her symptoms were attributed to her lung disease, and recovering from pneumonia.  She believes she may have had an echocardiogram in the past, does not currently follow with a cardiologist and has not had provocative testing.  Denies recent fevers, chills, hemoptysis, weight loss, abdominal pain, GI or  symptoms.  The patient recently had an outpatient CT scan of the chest as part of her pulmonology workup, which was only notable for pulmonary nodules scattered that measure up to 3 mm.    CDU PA: 60 y/o F with pmhx of HLD, GERD, PUD who presents to the ED after having found to be hypertensive at her pulmonologist's office. Pt was at pulmonologist's office for a methacholine challenge test (pt has two episodes of pna in the last year and is being worked up for reactive airway ds vs restrictive airway ds). Pt was noted to be hypertensive with SBP in 200s. Pt notes associated lightheadedness, severe HA, left sided chest discomfort. Sx resolved after a few minutes. On arrival to the ED, pt was again noted to be hypertensive and felt another episode of lightheadedness. HR on pulse ox was 40. Pt denies a hx of hypertension. Pt does not currently have a cardiologist, believes she may have had an echo or stress test years ago.   In ED, VSS, hypertension improved to 150/82 without intervention, pt did receive chlorthalidone 25mg in the ED around 1800. Cardiac monitor with possible ventricular trigeminy. However EKG NSR. Labs unremarkable. Trop 19-->13. CXR with no acute findings. Viral swab negative. Pt placed in CDU for echo, stress per cardiology recommendations.

## 2025-02-04 NOTE — ED PROVIDER NOTE - ATTENDING CONTRIBUTION TO CARE
Attending note:   After face to face evaluation of this patient, I concur with above noted hx, pe, and care plan for this patient. Mcclellan: 59-year-old female with history of hyperlipidemia and multiple pneumonias in the last year.  Patient has been following with pulm and was going today for her pulmonary testing when she reported feeling lightheaded.  At the doctor's office she had multiple readings of elevated blood pressure.  Patient has no history of high blood pressure and does not take any medications for this.  Patient has had multiple stressors in her life recently.  Patient had episode of chest pressure while in the ED waiting area.  Patient's blood pressure is elevated but mildly improved in the room.  Patient's telemetry monitor is reading in the 70s to 80s and is notably in between bigeminy, trigeminy and sinus rhythm.  However because of this patient's pulse ox reads heart rate in the 40s occasionally as sometimes beats or not transmitted peripherally.  Patient denies any headache or blurry vision or numbness or weakness.  Lungs are clear and heart is regular rate and rhythm.  There is no JVD or pitting edema.  Pulses are equal and strong in all extremities.  Neuroexam is unremarkable.  Patient is normal sinus rhythm with flipped T's in 3 and poor R wave progression only.  Will check electrolytes and monitor patient.  Would recommend cardiology eval given patient's lightheadedness and significant PVCs.

## 2025-02-04 NOTE — CONSULT NOTE ADULT - SUBJECTIVE AND OBJECTIVE BOX
C A R D I O L O G Y  *********************    DATE OF SERVICE: 02-04-25    HISTORY OF PRESENT ILLNESS: HPI: Pt is a 59 year old female with a pmh of HLD, GERD presented from pulmonology office with elevated BP. Has had recurrent PNA and possible asthma and was following up in pulm office for methacholine challange for confimatory diagnosis. Was found to have BP in 190s/110s in office. Once in ER also found to have BP > 200s. She  states has had headaches and changes in vision as well as SOB. Had recent CT scan which showed aortic and coronary artery calcifications. No previous history of DM. HYN, smoking. Possibly has sleep apnea but no formal diagnosis.      PAST MEDICAL & SURGICAL HISTORY:  GERD (gastroesophageal reflux disease)  High cholesterol  Gastritis  HLD  Ectopic pregnancy, tubal  Uterine fibroid  History of ectopic pregnancy  S/P excision of lipoma    MEDICATIONS:  MEDICATIONS  (STANDING):  chlorthalidone 25 milliGRAM(s) Oral Once      Allergies: No Known Allergies    FAMILY HISTORY: Non-contributary for premature coronary disease or sudden cardiac death    SOCIAL HISTORY:    [X ] Non-smoker  [ ] Smoker  [ ] Alcohol      REVIEW OF SYSTEMS:  [ ]chest pain  [  ]shortness of breath  [  ]palpitations  [  ]syncope  [ ]near syncope [ ]upper extremity weakness   [ ] lower extremity weakness  [  ]diplopia  [  ]altered mental status   [  ]fevers  [ ]chills [ ]nausea  [ ]vomiting  [  ]dysphagia    [ ]abdominal pain  [ ]melena  [ ]BRBPR    [  ]epistaxis  [  ]rash    [ ]lower extremity edema    [X] All others negative	  [ ] Unable to obtain      LABS:	 	    CARDIAC MARKERS:                    12.4   6.11  )-----------( 218      ( 04 Feb 2025 13:50 )             37.0     Hb Trend: 12.4<--    02-04    137  |  105  |  9   ----------------------------<  87  4.5   |  17[L]  |  0.58    Ca    9.9      04 Feb 2025 13:50  Phos  3.8     02-04  Mg     2.00     02-04    TPro  7.9  /  Alb  4.1  /  TBili  0.4  /  DBili  x   /  AST  17  /  ALT  12  /  AlkPhos  93  02-04  Creatinine Trend: 0.58<--  Coags:  PT/INR - ( 04 Feb 2025 13:50 )   PT: 12.0 sec;   INR: 1.03 ratio    PTT - ( 04 Feb 2025 13:50 )  PTT:34.7 sec    PHYSICAL EXAM:  T(C): 36.6 (02-04-25 @ 12:52), Max: 36.7 (02-04-25 @ 11:23)  HR: 76 (02-04-25 @ 14:56) (40 - 76)  BP: 154/89 (02-04-25 @ 14:56) (154/89 - 205/93)  RR: 16 (02-04-25 @ 14:56) (16 - 21)  SpO2: 100% (02-04-25 @ 14:56) (98% - 100%)  Wt(kg): --   BMI (kg/m2): 29.5 (02-04-25 @ 11:23)  I&O's Summary    General:  Alert and Oriented * 3.   Head: Normocephalic and atraumatic.   Neck: No JVD. No bruits. Supple. Does not appear to be enlarged.   Cardiovascular: + S1,S2 ; RRR Soft systolic murmur at the left lower sternal border. No rubs noted.    Lungs: CTA b/l. No rhonchi, rales or wheezes.   Abdomen: + BS, soft. Non tender. Non distended. No rebound. No guarding.   Extremities: No clubbing/cyanosis/edema.   Neurologic: Moves all four extremities. Full range of motion.   Skin: Warm and moist. The patient's skin has normal elasticity and good skin turgor.   Psychiatric: Appropriate mood and affect.  Musculoskeletal: Normal range of motion, normal strength       TELEMETRY: 	 NSR and NSR with PVC in pattern of bigeminy     ECG:  	NSR    RADIOLOGY:         CXR:   < from: CT Chest No Cont (01.16.25 @ 11:04) >  IMPRESSION:    There are scattered small bilateral pulmonary nodules measuring up to 3   mm. For example right middle lobe (3:88). 2 mm calcified granuloma in the   left upper lobe. The lungs are otherwise clear. There are no   consolidations. Recommend follow-up noncontrast chest CT in one year.    < end of copied text >      ASSESSMENT/PLAN: 	 Pt is a 59 year old female with a pmh of HLD, GERD presented from pulmonology office with elevated BP. Has had recurrent PNA and possible asthma and was following up in pulm office for methacholine challange for confimatory diagnosis. Was found to have BP in 190s/110s in office. Once in ER also found to have BP > 200s. She  states has had headaches and changes in vision as well as SOB. Had recent CT scan which showed aortic and coronary artery calcifications. No previous history of DM. HYN, smoking. Possibly has sleep apnea but no formal diagnosis.      HTN/HLD  - EKG no acute ischemia, BP initally in 200s now improved to 150s without treatment  - check TTE  - Check NST  - Start Chlorthalidone 25 mg PO daily  - will set up in office for HOLTER and BP monitoring  - c/w statin      Lashay Owens MD  Pager: [phone]

## 2025-02-04 NOTE — ED CDU PROVIDER INITIAL DAY NOTE - DETAILS
Pt placed in CDU for further cardiac w/u including echo, stress. Pt seen by Dr. Lashay Owens while in ED.

## 2025-02-04 NOTE — ED CDU PROVIDER INITIAL DAY NOTE - ATTENDING APP SHARED VISIT CONTRIBUTION OF CARE
Attending note:   After face to face evaluation of this patient, I concur with above noted hx, pe, and care plan for this patient. Mcclellan: 59-year-old female with history of hyperlipidemia and multiple pneumonias in the last year.  Patient has been following with pulm and was going today for her pulmonary testing when she reported feeling lightheaded.  At the doctor's office she had multiple readings of elevated blood pressure.  Patient has no history of high blood pressure and does not take any medications for this.  Patient has had multiple stressors in her life recently.  Patient had episode of chest pressure while in the ED waiting area.  Patient's blood pressure is elevated but mildly improved in the room.  Patient's telemetry monitor is reading in the 70s to 80s and is notably in between bigeminy, trigeminy and sinus rhythm.  However because of this patient's pulse ox reads heart rate in the 40s occasionally as sometimes beats or not transmitted peripherally.  Patient denies any headache or blurry vision or numbness or weakness.  Lungs are clear and heart is regular rate and rhythm.  There is no JVD or pitting edema.  Pulses are equal and strong in all extremities.  Neuroexam is unremarkable.  Patient is normal sinus rhythm with flipped T's in 3 and poor R wave progression only.  Will check electrolytes and monitor patient.  Would recommend cardiology eval given patient's lightheadedness and significant PVCs which will happened in CDU.

## 2025-02-04 NOTE — ED PROVIDER NOTE - NSICDXPASTMEDICALHX_GEN_ALL_CORE_FT
PAST MEDICAL HISTORY:  Gastritis     GERD (gastroesophageal reflux disease)     High cholesterol     HLD (hyperlipidemia)

## 2025-02-04 NOTE — ED ADULT TRIAGE NOTE - CHIEF COMPLAINT QUOTE
Patient sent from MD office for elevated BP, patient also reports neck pain, headache and blurry vision x 2-3 days. Denies chest pain, SOB. Denies history of HTN. Phx HLD Patient sent from MD office for elevated BP, patient also reports neck pain, headache and blurry vision x 2-3 days. Denies chest pain, SOB. Denies history of HTN. Phx HLD    1212: patient reporting lightheadedness, HR 40-60, charge RN made aware

## 2025-02-04 NOTE — ED CDU PROVIDER INITIAL DAY NOTE - CLINICAL SUMMARY MEDICAL DECISION MAKING FREE TEXT BOX
58 y/o F with pmhx of HLD, GERD, PUD who presents to the ED after having found to be hypertensive at her pulmonologist's office. Pt was at pulmonologist's office for a methacholine challenge test (pt has two episodes of pna in the last year and is being worked up for reactive airway ds vs restrictive airway ds). Pt was noted to be hypertensive with SBP in 200s. Pt notes associated lightheadedness, severe HA, left sided chest discomfort. Sx resolved after a few minutes. On arrival to the ED, pt was again noted to be hypertensive and felt another episode of lightheadedness. HR on pulse ox was 40, however if pt was in bigeminy likely that HR was low 2/2 pulse ox not picking up beats. While in ED on cardiac monitor, pt HR has been in 60s. Pt denies a hx of hypertension. Pt does not currently have a cardiologist, believes she may have had an echo or stress test years ago.   In ED, VSS, hypertension improved to 150/82 without intervention, pt did receive chlorthalidone 25mg in the ED around 1800. Cardiac monitor with possible ventricular trigeminy. However EKG NSR. Labs unremarkable. Trop 19-->13. CXR with no acute findings. Viral swab negative. Pt placed in CDU for echo, stress per cardiology recommendations. If workup negative, pt can likely be discharged with outpt f/u.

## 2025-02-04 NOTE — ED ADULT NURSE NOTE - OBJECTIVE STATEMENT
pt received to room 28, A&Ox4, ambulatory with steady gait, hx of HLD, GERD coming to ED from pulmonologist  for hypertension. Pt endorsing lightheadedness and chest tightness in triage. Denies SOB, headache, blurry vision, fever, chills, N/V, or abdominal pain. RR even and unlabored, spo2 100% on RA. placed on cardiac monitor noted to be NSR. EKG in chart. abdomen soft nontender and nondistended. no neuro deficits noted. skin clean dry and intact. 20 G IV placed to right AC. labs drawn and sent. safety maintained. pending lab results.

## 2025-02-04 NOTE — ED PROVIDER NOTE - CLINICAL SUMMARY MEDICAL DECISION MAKING FREE TEXT BOX
59-year-old female with past medical history of GERD, peptic ulcer disease, hyperlipidemia presents emergency department for episode of hypertension with associated lightheadedness and chest tightness today while at outpatient appointment. Patient noted to be "bradycardic" in triage, however once on monitor patient in bigeminy/trigeminy, pulse ox likely not picking up these beats. NSR on EKG. Patient may have had an echo a few years ago, but has never had a stress test. No focal physical exam findings. Hypertensive on arrival, BP lowered in ED without intervention. Will check labs, including trop, CXR, consult cards and consider CDU vs tele doc admission for further workup.

## 2025-02-04 NOTE — ED PROVIDER NOTE - NSICDXPASTSURGICALHX_GEN_ALL_CORE_FT
PAST SURGICAL HISTORY:  Ectopic pregnancy, tubal     History of ectopic pregnancy     S/P excision of lipoma     Uterine fibroid

## 2025-02-04 NOTE — ED PROVIDER NOTE - OBJECTIVE STATEMENT
59-year-old female with past medical history of GERD, peptic ulcer disease, hyperlipidemia presents emergency department for episode of hypertension with associated lightheadedness and chest tightness today while at outpatient appointment.  Per daughter at bedside, the patient has been seeing pulmonology for evaluation as she has had 2 episodes of pneumonia within the past year.  She was having a methacholine challenge test today, when she was found to be hypertensive to the 200s over 110 range.  The patient does not currently take medication for her hypertension.  Patient endorses over the past few months she will have intermittent left-sided chest tightness.  Her symptoms were attributed to her lung disease, and recovering from pneumonia.  She believes she may have had an echocardiogram in the past, does not currently follow with a cardiologist and has not had provocative testing.  Denies recent fevers, chills, hemoptysis, weight loss, abdominal pain, GI or  symptoms.  The patient recently had an outpatient CT scan of the chest as part of her pulmonology workup, which was only notable for pulmonary nodules scattered that measure up to 3 mm.

## 2025-02-04 NOTE — ED CDU PROVIDER INITIAL DAY NOTE - PROGRESS NOTE DETAILS
Pt received at sign  out pending stress, echo and telemetry monitoring. Pt notes she has been feeling  lightheaded and having CP intermittently since receiving BP medication, L-sided HA improved with Tylenol will continue to monitor,.

## 2025-02-04 NOTE — ED ADULT NURSE NOTE - CHIEF COMPLAINT QUOTE
Patient sent from MD office for elevated BP, patient also reports neck pain, headache and blurry vision x 2-3 days. Denies chest pain, SOB. Denies history of HTN. Phx HLD    1212: patient reporting lightheadedness, HR 40-60, charge RN made aware

## 2025-02-05 ENCOUNTER — RESULT REVIEW (OUTPATIENT)
Age: 60
End: 2025-02-05

## 2025-02-05 VITALS
TEMPERATURE: 98 F | SYSTOLIC BLOOD PRESSURE: 146 MMHG | DIASTOLIC BLOOD PRESSURE: 77 MMHG | HEART RATE: 84 BPM | RESPIRATION RATE: 16 BRPM | OXYGEN SATURATION: 97 %

## 2025-02-05 PROCEDURE — 99239 HOSP IP/OBS DSCHRG MGMT >30: CPT

## 2025-02-05 PROCEDURE — 93306 TTE W/DOPPLER COMPLETE: CPT | Mod: 26

## 2025-02-05 PROCEDURE — 78451 HT MUSCLE IMAGE SPECT SING: CPT | Mod: 26

## 2025-02-05 PROCEDURE — 93018 CV STRESS TEST I&R ONLY: CPT | Mod: GC

## 2025-02-05 PROCEDURE — 93016 CV STRESS TEST SUPVJ ONLY: CPT | Mod: GC

## 2025-02-05 PROCEDURE — 76376 3D RENDER W/INTRP POSTPROCES: CPT | Mod: 26

## 2025-02-05 PROCEDURE — 93356 MYOCRD STRAIN IMG SPCKL TRCK: CPT

## 2025-02-05 RX ORDER — ACETAMINOPHEN 160 MG/5ML
975 SUSPENSION ORAL ONCE
Refills: 0 | Status: COMPLETED | OUTPATIENT
Start: 2025-02-05 | End: 2025-02-05

## 2025-02-05 RX ORDER — CHLORTHALIDONE 100 MG
1 TABLET ORAL
Qty: 30 | Refills: 0
Start: 2025-02-05 | End: 2025-03-06

## 2025-02-05 RX ADMIN — FAMOTIDINE 20 MILLIGRAM(S): 10 INJECTION INTRAVENOUS at 10:54

## 2025-02-05 RX ADMIN — FLUTICASONE PROPIONATE 1 SPRAY(S): 50 SPRAY, METERED NASAL at 10:55

## 2025-02-05 RX ADMIN — Medication 10 MILLIGRAM(S): at 10:53

## 2025-02-05 RX ADMIN — ACETAMINOPHEN 975 MILLIGRAM(S): 160 SUSPENSION ORAL at 10:53

## 2025-02-05 NOTE — ED CDU PROVIDER DISPOSITION NOTE - PATIENT PORTAL LINK FT
You can access the FollowMyHealth Patient Portal offered by [facility] by registering at the following website: [email or web address]. By joining [employer]’s FollowMyHealth portal, you will also be able to view your health information using other applications (apps) compatible with our system.

## 2025-02-05 NOTE — ED CDU PROVIDER SUBSEQUENT DAY NOTE - CLINICAL SUMMARY MEDICAL DECISION MAKING FREE TEXT BOX
60 y/o F with pmhx of HLD, GERD, PUD who presents to the ED after having found to be hypertensive at her pulmonologist's office. Pt was at pulmonologist's office for a methacholine challenge test (pt has two episodes of pna in the last year and is being worked up for reactive airway ds vs restrictive airway ds). Pt was noted to be hypertensive with SBP in 200s. Pt notes associated lightheadedness, severe HA, left sided chest discomfort. Sx resolved after a few minutes. On arrival to the ED, pt was again noted to be hypertensive and felt another episode of lightheadedness. HR on pulse ox was 40, however if pt was in bigeminy likely that HR was low 2/2 pulse ox not picking up beats. While in ED on cardiac monitor, pt HR has been in 60s. Pt denies a hx of hypertension. Pt does not currently have a cardiologist, believes she may have had an echo or stress test years ago.   In ED, VSS, hypertension improved to 150/82 without intervention, pt did receive chlorthalidone 25mg in the ED around 1800. Cardiac monitor with possible ventricular trigeminy. However EKG NSR. Labs unremarkable. Trop 19-->13. CXR with no acute findings. Viral swab negative. Pt placed in CDU for echo, stress per cardiology recommendations. If workup negative, pt can likely be discharged with outpt f/u.

## 2025-02-05 NOTE — ED CDU PROVIDER DISPOSITION NOTE - DISCHARGE REVIEW MATERIAL PRESENTED
.
Quality 130: Documentation Of Current Medications In The Medical Record: Current Medications Documented
Detail Level: Detailed

## 2025-02-05 NOTE — ED CDU PROVIDER DISPOSITION NOTE - NSFOLLOWUPINSTRUCTIONS_ED_ALL_ED_FT
Please follow up with your Primary Care doctor within 1 week to have a repeat Basic Metabolic Panel and Potassium level. Thank you      Hypertension    WHAT YOU NEED TO KNOW:    What is hypertension? Hypertension is high blood pressure. Your blood pressure is the force of your blood moving against the walls of your arteries. Hypertension causes your blood pressure to get so high that your heart has to work much harder than normal. This can damage your heart. Hypertension that does not respond to medicines and lifestyle changes is called resistant hypertension. Hypertension is considered chronic when it continues for 3 months or longer.    What do I need to know about the stages of hypertension? Your healthcare provider will give you a blood pressure goal based on your age, health, and risk for cardiovascular disease. The following are general guidelines on the stages of hypertension:    Normal blood pressure is 119/79 or lower. Your healthcare provider may only check your blood pressure each year if it stays at a normal level.    Elevated blood pressure is 120/79 to 129/79. This is sometimes called prehypertension. Your healthcare provider may suggest lifestyle changes to help lower your blood pressure to a normal level. He or she may then check it again in 3 to 6 months.    Stage 1 hypertension is 130/80 to 139/89. Your provider may recommend lifestyle changes, medication, and checks every 3 to 6 months until your blood pressure is controlled.    Stage 2 hypertension is 140/90 or higher. Your provider will recommend lifestyle changes and have you take 2 kinds of hypertension medicines. You will also need to have your blood pressure checked monthly until it is controlled.  Blood Pressure Readings  What increases my risk for hypertension? The cause of hypertension may not be known. This is called essential or primary hypertension. Hypertension caused by another medical condition, such as kidney disease, is called secondary hypertension. Any of the following can increase your risk:    Age older than 55 years (men) or 65 (women)    Stress, or a family history of hypertension or heart disease    Obesity, lack of exercise, or too many high-sodium foods    Use of tobacco, alcohol, or illegal drugs    A medical condition, such as diabetes, kidney disease, thyroid disease, or adrenal gland disorder    Certain medicines, such as steroids or birth control pills  What are the signs and symptoms of hypertension? You may have no signs or symptoms, or you may have any of the following:    Headache    Blurred vision    Chest pain    Dizziness or weakness    Trouble breathing    Nosebleeds  How is hypertension diagnosed? Your healthcare provider will take your blood pressure at several visits. You may also need to check your blood pressure at home. The provider will examine you and ask about medicines you take. He or she will also ask if you have a family history of high blood pressure and about any health conditions you have. He or she will also check your blood pressure and weight and examine your heart, lungs, and eyes. You may need any of the following tests:    An ambulatory blood pressure monitor (ABPM) is a device that you wear. ABPM measures your blood pressure while you do your regular daily activities. It records your blood pressure every 15 to 30 minutes during the day. It also records your blood pressure every 15 minutes to 1 hour at night. The recorded blood pressures help your healthcare provider know if you have hypertension not seen at your appointment.    Blood tests may help healthcare providers find the cause of your hypertension. Blood tests can also help find other health problems caused by hypertension.    Urine tests will be done to check your kidney function. Kidney problems can increase your risk for hypertension.  Which medicines are used to treat hypertension?    Antihypertensives may be used to help lower your blood pressure. Several kinds of medicines are available. Your healthcare provider will choose medicines based on the kind of hypertension you have. You may need more than one type of medicine. Take the medicine exactly as directed.    Diuretics help decrease extra fluid that collects in your body. This will help lower your blood pressure. You may urinate more often while you take this medicine.    Cholesterol medicine helps lower your cholesterol level. A low cholesterol level helps prevent heart disease and makes it easier to control your blood pressure.  What can I do to manage hypertension?    Check your blood pressure at home. Do not smoke, have caffeine, or exercise for at least 30 minutes before you check your blood pressure. Sit and rest for 5 minutes before you check your blood pressure. Extend your arm and support it on a flat surface. Your arm should be at the same level as your heart. Follow the directions that came with your blood pressure monitor. Check your blood pressure 2 times, 1 minute apart, before you take your medicine in the morning. Also check your blood pressure before your evening meal. Keep a record of your readings and bring it to your follow-up visits. Ask your healthcare provider what your blood pressure should be.  How to take a Blood Pressure      Manage any other health conditions you have. Health conditions such as diabetes can increase your risk for hypertension. Follow your healthcare provider's instructions and take all your medicines as directed.    Ask about all medicines. Certain medicines can increase your blood pressure. Examples include oral birth control pills, decongestants, herbal supplements, and NSAIDs, such as ibuprofen. Your healthcare provider can tell you which medicines are safe for you to take. This includes prescription and over-the-counter medicines.  What lifestyle changes can I make to manage hypertension? Your healthcare provider may recommend you work with a team to manage hypertension. The team may include medical experts such as a dietitian, an exercise or physical therapist, and a behavior therapist. Your family members may be included in helping you create lifestyle changes.  Ways to Lower Your Blood Pressure    Limit sodium (salt) as directed. Too much sodium can affect your fluid balance. Check labels to find low-sodium or no-salt-added foods. Some low-sodium foods use potassium salts for flavor. Too much potassium can also cause health problems. Your healthcare provider will tell you how much sodium and potassium are safe for you to have in a day. He or she may recommend that you limit sodium to 2,300 mg a day.        Follow the meal plan recommended by your healthcare provider. A dietitian or your provider can give you more information on low-sodium plans or the DASH (Dietary Approaches to Stop Hypertension) eating plan. The DASH plan is low in sodium, processed sugar, unhealthy fats, and total fat. It is high in potassium, calcium, and fiber. These can be found in vegetables, fruit, and whole-grain foods.        Be physically active throughout the day. Physical activity, such as exercise, can help control your blood pressure and your weight. Be physically active for at least 30 minutes per day, on most days of the week. Include aerobic activity, such as walking or riding a bicycle. Also include strength training at least 2 times each week. Your healthcare providers can help you create a physical activity plan.  Ways to Be Physically Active  Strength Training for Adults      Decrease stress. This may help lower your blood pressure. Learn ways to relax, such as deep breathing or listening to music.    Limit alcohol as directed. Alcohol can increase your blood pressure. A drink of alcohol is 12 ounces of beer, 5 ounces of wine, or 1½ ounces of liquor.    Do not smoke. Nicotine and other chemicals in cigarettes and cigars can increase your blood pressure and also cause lung damage. Ask your healthcare provider for information if you currently smoke and need help to quit. E-cigarettes or smokeless tobacco still contain nicotine. Talk to your healthcare provider before you use these products.  Prevent Heart Disease   Call your local emergency number (911 in the [place]) or have someone call if:    You have chest pain.    You have any of the following signs of a heart attack:  Squeezing, pressure, or pain in your chest    You may also have any of the following:  Discomfort or pain in your back, neck, jaw, stomach, or arm    Shortness of breath    Nausea or vomiting    Lightheadedness or a sudden cold sweat    You become confused or have trouble speaking.    You suddenly feel lightheaded or have trouble breathing.  When should I seek immediate care?    You have a severe headache or vision loss.    You have weakness in an arm or leg.  When should I call my doctor?    You feel faint, dizzy, confused, or drowsy.    You have been taking your blood pressure medicine but your pressure is higher than your provider says it should be.    You have questions or concerns about your condition or care.  CARE AGREEMENT:    You have the right to help plan your care. Learn about your health condition and how it may be treated. Discuss treatment options with your healthcare providers to decide what care you want to receive. You always have the right to refuse treatment    Heart Palpitations    WHAT YOU NEED TO KNOW:    What are heart palpitations? Heart palpitations are feelings that your heart races, jumps, throbs, or flutters. You may feel extra beats, no beats for a short time, or skipped beats. You may have these feelings in your chest, throat, or neck. They may happen when you are sitting, standing, or lying. Heart palpitations may be frightening, but are usually not caused by a serious problem.    What causes heart palpitations?    Anxiety, stress, or lack of sleep    Exercise    A fever or illness    Medicines, such as diet pills, certain cold and allergy medicines, and herbal supplements such as ginseng    Caffeine, nicotine, or illegal drugs such as cocaine    Pregnancy    Medical conditions, such has dehydration, thyroid disease, low blood sugar level, or anemia  How are heart palpitations diagnosed? Your healthcare provider will examine you and ask about your symptoms. Tell the provider if you smoke, use illegal drugs, or have a family history of heart problems. Rarely, heart palpitations may be caused by a more serious condition. Examples include a heart valve problem, heart failure, or a problem with how your heart beats. You may need tests to make sure your palpitations are not caused by a more serious problem:    Blood and urine tests measure your electrolyte, blood cell, and blood sugar levels. Your thyroid hormone levels may also be measured. If you are a woman, your blood or urine may be tested for pregnancy hormones.    An EKG test records your heart rhythm and how fast your heart beats. It is used to check for abnormal heart beats or heart damage. You may also need to wear a Holter monitor while you do your usual activities. A Holter monitor is a portable EKG that you may wear for 24 to 48 hours.  Holter Monitor      An echocardiogram is a type of ultrasound. Sound waves are used to show the structure and function of your heart.    An exercise stress test helps healthcare providers see how well your heart handles stress. The test can check for blockages in your heart or abnormal heartbeats. Ask your healthcare provider for more information about a stress test.    An electrophysiology study is a procedure to check the electrical pathways in your heart. The electrical pathways control your heartbeat.  How are heart palpitations treated? Palpitations usually do not need treatment. Your healthcare provider may stop or change your medicines if they are causing your palpitations. Conditions that cause palpitations, such as an abnormal heartbeat, will be treated.    What can I do to help prevent heart palpitations?    Manage stress and anxiety. Find ways to relax such as listening to music, meditating, or doing yoga. Exercise can also help decrease stress and anxiety. Talk to someone you trust about your stress or anxiety. You can also talk to a therapist.    Get plenty of sleep every night. Ask your healthcare provider how much sleep you need each night.    Do not drink caffeine or alcohol. Caffeine and alcohol can make your palpitations worse. Caffeine is found in soda, coffee, tea, chocolate, and drinks that increase your energy.    Do not smoke. Nicotine and other chemicals in cigarettes and cigars may damage your heart and blood vessels. Ask your healthcare provider for information if you currently smoke and need help to quit. E-cigarettes or smokeless tobacco still contain nicotine. Talk to your healthcare provider before you use these products.    Do not use illegal drugs. Talk to your healthcare provider if you use illegal drugs and want help to quit.  Call 911 or have someone else call for any of the following:    You have any of the following signs of a heart attack:  Squeezing, pressure, or pain in your chest    You may also have any of the following:  Discomfort or pain in your back, neck, jaw, stomach, or arm    Shortness of breath    Nausea or vomiting    Lightheadedness or a sudden cold sweat    You have any of the following signs of a stroke:  Numbness or drooping on one side of your face    Weakness in an arm or leg    Confusion or difficulty speaking    Dizziness, a severe headache, or vision loss    You faint or lose consciousness.  When should I seek immediate care?    Your palpitations happen more often or last longer than usual.    You have palpitations and shortness of breath, nausea, sweating, or dizziness.  When should I contact my healthcare provider?    You have questions or concerns about your condition or care.    CARE AGREEMENT:    You have the right to help plan your care. Learn about your health condition and how it may be treated. Discuss treatment options with your healthcare providers to decide what care you want to receive. You always have the right to refuse treatment.

## 2025-02-05 NOTE — ED CDU PROVIDER SUBSEQUENT DAY NOTE - HISTORY
60 y/o F with pmhx of HLD, GERD, PUD who presents to the ED after having found to be hypertensive at her pulmonologist's office. Pt was at pulmonologist's office for a methacholine challenge test (pt has two episodes of pna in the last year and is being worked up for reactive airway ds vs restrictive airway ds). Pt was noted to be hypertensive with SBP in 200s. Pt notes associated lightheadedness, severe HA, left sided chest discomfort. Sx resolved after a few minutes. On arrival to the ED, pt was again noted to be hypertensive and felt another episode of lightheadedness. HR on pulse ox was 40, however if pt was in bigeminy likely that HR was low 2/2 pulse ox not picking up beats. While in ED on cardiac monitor, pt HR has been in 60s. Pt denies a hx of hypertension. Pt does not currently have a cardiologist, believes she may have had an echo or stress test years ago.   In ED, VSS, hypertension improved to 150/82 without intervention, pt did receive chlorthalidone 25mg in the ED around 1800. Cardiac monitor with possible ventricular trigeminy. However EKG NSR. Labs unremarkable. Trop 19-->13. CXR with no acute findings. Viral swab negative. Pt placed in CDU for echo, stress per cardiology recommendations. If workup negative, pt can likely be discharged with outpt f/u.      Interval hx- VSS, no events on tele monitor thus far. Awaiting tests in AM.

## 2025-02-05 NOTE — ED CDU PROVIDER DISPOSITION NOTE - CLINICAL COURSE
58 y/o female pmh hld, gerd c/o HTN. Pt had normal labs but EKG was significant for Bigeminy. Cardiology was consulted who recommended CDU for tele, echo and stress. Pt did well overnight with no acute events. Echo and stress were both negative for acute pathology. PT was reevaluated by cards and cleared for dc. Pt to f/u outpatient to arrange holter monitor.

## 2025-02-05 NOTE — ED CDU PROVIDER SUBSEQUENT DAY NOTE - ATTENDING APP SHARED VISIT CONTRIBUTION OF CARE
I have personally performed a face to face medical and diagnostic evaluation of the patient. I have discussed with and reviewed the MARLIN's note and agree with the History, ROS, Physical Exam and MDM unless otherwise indicated. A brief summary of my personal evaluation and impression can be found below. I actively participated in the comanagement of this patient with the MARLIN. I have personally reviewed all orders, study/imaging results, medication orders. I discussed indications for consultant evaluation and consultant recommendations with the MARLIN when applicable, and have discussed the disposition plan with the MARLIN.    Sumeet VIEYRA: 60 y/o F with pmhx of HLD, GERD, PUD who presents to the ED after having found to be hypertensive at her pulmonologist's office. Pt was at pulmonologist's office for a methacholine challenge test (pt has two episodes of pna in the last year and is being worked up for reactive airway ds vs restrictive airway ds). Pt was noted to be hypertensive with SBP in 200s. Pt notes associated lightheadedness, severe HA, left sided chest discomfort. Sx resolved after a few minutes. On arrival to the ED, pt was again noted to be hypertensive and felt another episode of lightheadedness. HR on pulse ox was 40, however if pt was in bigeminy likely that HR was low 2/2 pulse ox not picking up beats. While in ED on cardiac monitor, pt HR has been in 60s. Pt denies a hx of hypertension. Pt does not currently have a cardiologist, believes she may have had an echo or stress test years ago.   In ED, VSS, hypertension improved to 150/82 without intervention, pt did receive chlorthalidone 25mg in the ED around 1800. Cardiac monitor with possible ventricular trigeminy. However EKG NSR. Labs unremarkable. Trop 19-->13. CXR with no acute findings. Viral swab negative. Pt placed in CDU for echo, stress per cardiology recommendations. If workup negative, pt can likely be discharged with outpt f/u.    No acute events overnight. Labs and imaging reviewed. During morning rounds pt reports no acute complaints.    All other ROS negative, except as above and as per HPI and ROS section.    VITALS: Initial triage and subsequent vitals have been reviewed by me.  GEN APPEARANCE: Alert, non-toxic, well-appearing, NAD.  HEAD: Atraumatic.  EYES: PERRLa, EOMI, vision grossly intact.   NECK: Supple  CV: RRR, S1S2, no c/r/m/g. Cap refill <2 seconds. No bruits.   LUNGS: CTAB. No abnormal breath sounds.  ABDOMEN: Soft, NTND. No guarding or rebound.   MSK/EXT: No spinal or extremity point tenderness. No CVA ttp. Pelvis stable. No peripheral edema.  NEURO: Alert, follows commands. Weight bearing normal. Speech normal. Sensation and motor normal x4 extremities.   SKIN: Warm, dry and intact. No rash.  PSYCH: Appropriate    Plan/MDM:  exam vss non toxic w PE as above pt also here w episodes of bigem / trigeminy? cardiology following presentation does not appear c/w pe, dissection, plan for pt to get stress/echo will reassess after studies, cardiology following.

## 2025-02-05 NOTE — PROGRESS NOTE ADULT - SUBJECTIVE AND OBJECTIVE BOX
DATE OF SERVICE: 02-05-25    Patient denies chest pain or shortness of breath.   Review of symptoms otherwise negative.    MEDICATIONS:  cetirizine 10 milliGRAM(s) Oral daily  chlorthalidone 25 milliGRAM(s) Oral daily  famotidine    Tablet 20 milliGRAM(s) Oral two times a day  fluticasone propionate 50 MICROgram(s)/spray Nasal Spray 1 Spray(s) Both Nostrils two times a day  fluticasone propionate/ salmeterol 100-50 MICROgram(s) Diskus 1 Dose(s) Inhalation two times a day  rosuvastatin 10 milliGRAM(s) Oral at bedtime      LABS:                        12.4   6.11  )-----------( 218      ( 04 Feb 2025 13:50 )             37.0       Hemoglobin: 12.4 g/dL (02-04 @ 13:50)      02-04    137  |  105  |  9   ----------------------------<  87  4.5   |  17[L]  |  0.58    Ca    9.9      04 Feb 2025 13:50  Phos  3.8     02-04  Mg     2.00     02-04    TPro  7.9  /  Alb  4.1  /  TBili  0.4  /  DBili  x   /  AST  17  /  ALT  12  /  AlkPhos  93  02-04    Creatinine Trend: 0.58<--    COAGS:           PHYSICAL EXAM:  T(C): 36.6 (02-05-25 @ 10:48), Max: 36.7 (02-04-25 @ 12:12)  HR: 84 (02-05-25 @ 10:48) (40 - 84)  BP: 146/77 (02-05-25 @ 10:48) (137/71 - 194/97)  RR: 16 (02-05-25 @ 10:48) (16 - 21)  SpO2: 97% (02-05-25 @ 10:48) (97% - 100%)  Wt(kg): --    I&O's Summary      General:  Alert and Oriented * 3.   Head: Normocephalic and atraumatic.   Neck: No JVD. No bruits. Supple. Does not appear to be enlarged.   Cardiovascular: + S1,S2 ; RRR Soft systolic murmur at the left lower sternal border. No rubs noted.    Lungs: CTA b/l. No rhonchi, rales or wheezes.   Abdomen: + BS, soft. Non tender. Non distended. No rebound. No guarding.   Extremities: No clubbing/cyanosis/edema.   Neurologic: Moves all four extremities. Full range of motion.   Skin: Warm and moist. The patient's skin has normal elasticity and good skin turgor.   Psychiatric: Appropriate mood and affect.  Musculoskeletal: Normal range of motion, normal strength       TELEMETRY: 	 NSR and NSR with PVC in pattern of bigeminy     ECG:  	NSR    RADIOLOGY:         CXR:   < from: CT Chest No Cont (01.16.25 @ 11:04) >  IMPRESSION:    There are scattered small bilateral pulmonary nodules measuring up to 3   mm. For example right middle lobe (3:88). 2 mm calcified granuloma in the   left upper lobe. The lungs are otherwise clear. There are no   consolidations. Recommend follow-up noncontrast chest CT in one year.      < from: TTE W or WO Ultrasound Enhancing Agent (02.05.25 @ 06:40) >    CONCLUSIONS:      1. Left ventricular systolic function is normal with an ejection fraction of 65 % by 3D. There are no regional wall motion abnormalities seen.   2. Left ventricular global longitudinal strain is -19.9 % which is normal (< -18%). Images were acquired on a [employer] ultrasound system and processed on the ultrasound machine with a heart rate of 52 bpm and a blood pressure of 158/81 mmHg.   3. Normal left ventricular diastolic function, with normal left ventricular filling pressure.   4. Normal left and right atrial size.   5. Normal right ventricular cavity size, with normal wall thickness, and normal right ventricular systolic function. Tricuspid annular plane systolic excursion (TAPSE) is 2.2 cm (normal >=1.7 cm).   6. No significant valvular disease.   7. Aortic valve anatomy cannot be determined with normal systolic excursion.   8. The inferior vena cava is normal in size measuring 1.19 cm in diameter, (normal <2.1cm) with normal inspiratory collapse (normal >50%) consistent with normal right atrial pressure (~3, range 0-5mmHg).   9. No pericardial effusion seen.  10. No prior echocardiogram is available for comparison.    < end of copied text >      < from: Nuclear Stress Test-Exercise.. (02.05.25 @ 08:29) >  Conclusions:   1. Normal myocardial perfusion scan, with no evidence of infarction or inducible ischemia.   2. Stress electrocardiogram: No significant ischemic ST segment changes.   3. The leftventricle is normal in function and normal in size. The post stress left ventricular EF is 78 %. The stress end diastolic volume is 70 ml and systolic volume is 16 ml.    < end of copied text >    Tele: NSR with PVC     ASSESSMENT/PLAN: 	 Pt is a 59 year old female with a pmh of HLD, GERD presented from pulmonology office with elevated BP. Has had recurrent PNA and possible asthma and was following up in pulm office for methacholine challange for confimatory diagnosis. Was found to have BP in 190s/110s in office. Once in ER also found to have BP > 200s. She  states has had headaches and changes in vision as well as SOB. Had recent CT scan which showed aortic and coronary artery calcifications. No previous history of DM. HYN, smoking. Possibly has sleep apnea but no formal diagnosis.      HTN/HLD  - EKG no acute ischemia, BP initially in 200s now improved to 150s   - TTE with normal LVEF and no RWMA  - NST with noperfusion deficits  - c/w Chlorthalidone 25 mg PO daily, will need BMP for creatinine and potassium in one week  - will set up in office for HOLTER and BP monitoring  - c/w statin      Lashay Owens MD  Pager: [phone]

## 2025-02-05 NOTE — ED CDU PROVIDER DISPOSITION NOTE - ATTENDING CONTRIBUTION TO CARE
I have personally performed a face to face medical and diagnostic evaluation of the patient. I have discussed with and reviewed the MARLIN's note and agree with the History, ROS, Physical Exam and MDM unless otherwise indicated. A brief summary of my personal evaluation and impression can be found below. I actively participated in the comanagement of this patient with the MARLIN. I have personally reviewed all orders, study/imaging results, medication orders. I discussed indications for consultant evaluation and consultant recommendations with the MARLIN when applicable, and have discussed the disposition plan with the MARLIN.    Please see CDU Subsequent day note for further details.

## 2025-03-06 ENCOUNTER — APPOINTMENT (OUTPATIENT)
Dept: PULMONOLOGY | Facility: CLINIC | Age: 60
End: 2025-03-06
Payer: MEDICAID

## 2025-03-06 VITALS
HEIGHT: 64 IN | RESPIRATION RATE: 17 BRPM | DIASTOLIC BLOOD PRESSURE: 73 MMHG | OXYGEN SATURATION: 98 % | SYSTOLIC BLOOD PRESSURE: 120 MMHG | HEART RATE: 98 BPM | WEIGHT: 170 LBS | BODY MASS INDEX: 29.02 KG/M2

## 2025-03-06 DIAGNOSIS — R05.3 CHRONIC COUGH: ICD-10-CM

## 2025-03-06 DIAGNOSIS — K21.9 GASTRO-ESOPHAGEAL REFLUX DISEASE W/OUT ESOPHAGITIS: ICD-10-CM

## 2025-03-06 DIAGNOSIS — J45.30 MILD PERSISTENT ASTHMA, UNCOMPLICATED: ICD-10-CM

## 2025-03-06 DIAGNOSIS — E04.1 NONTOXIC SINGLE THYROID NODULE: ICD-10-CM

## 2025-03-06 PROCEDURE — 99215 OFFICE O/P EST HI 40 MIN: CPT

## 2025-03-06 PROCEDURE — G2211 COMPLEX E/M VISIT ADD ON: CPT | Mod: NC

## 2025-03-18 ENCOUNTER — OUTPATIENT (OUTPATIENT)
Dept: OUTPATIENT SERVICES | Facility: HOSPITAL | Age: 60
LOS: 1 days | End: 2025-03-18
Payer: MEDICAID

## 2025-03-18 ENCOUNTER — APPOINTMENT (OUTPATIENT)
Dept: ULTRASOUND IMAGING | Facility: CLINIC | Age: 60
End: 2025-03-18

## 2025-03-18 DIAGNOSIS — Z87.59 PERSONAL HISTORY OF OTHER COMPLICATIONS OF PREGNANCY, CHILDBIRTH AND THE PUERPERIUM: Chronic | ICD-10-CM

## 2025-03-18 DIAGNOSIS — O00.109 UNSPECIFIED TUBAL PREGNANCY WITHOUT INTRAUTERINE PREGNANCY: Chronic | ICD-10-CM

## 2025-03-18 DIAGNOSIS — D25.9 LEIOMYOMA OF UTERUS, UNSPECIFIED: Chronic | ICD-10-CM

## 2025-03-18 DIAGNOSIS — Z98.890 OTHER SPECIFIED POSTPROCEDURAL STATES: Chronic | ICD-10-CM

## 2025-03-18 DIAGNOSIS — E04.1 NONTOXIC SINGLE THYROID NODULE: ICD-10-CM

## 2025-03-18 PROCEDURE — 76536 US EXAM OF HEAD AND NECK: CPT | Mod: 26

## 2025-03-18 PROCEDURE — 76536 US EXAM OF HEAD AND NECK: CPT

## 2025-04-03 DIAGNOSIS — E04.1 NONTOXIC SINGLE THYROID NODULE: ICD-10-CM

## 2025-05-13 ENCOUNTER — NON-APPOINTMENT (OUTPATIENT)
Age: 60
End: 2025-05-13

## 2025-05-15 ENCOUNTER — APPOINTMENT (OUTPATIENT)
Dept: INTERNAL MEDICINE | Facility: CLINIC | Age: 60
End: 2025-05-15
Payer: MEDICAID

## 2025-05-15 VITALS — DIASTOLIC BLOOD PRESSURE: 80 MMHG | SYSTOLIC BLOOD PRESSURE: 130 MMHG

## 2025-05-15 VITALS
HEIGHT: 64 IN | BODY MASS INDEX: 28.51 KG/M2 | WEIGHT: 167 LBS | SYSTOLIC BLOOD PRESSURE: 131 MMHG | HEART RATE: 70 BPM | OXYGEN SATURATION: 98 % | DIASTOLIC BLOOD PRESSURE: 79 MMHG

## 2025-05-15 DIAGNOSIS — E78.00 PURE HYPERCHOLESTEROLEMIA, UNSPECIFIED: ICD-10-CM

## 2025-05-15 DIAGNOSIS — J45.30 MILD PERSISTENT ASTHMA, UNCOMPLICATED: ICD-10-CM

## 2025-05-15 DIAGNOSIS — H91.91 UNSPECIFIED HEARING LOSS, RIGHT EAR: ICD-10-CM

## 2025-05-15 DIAGNOSIS — Z23 ENCOUNTER FOR IMMUNIZATION: ICD-10-CM

## 2025-05-15 DIAGNOSIS — Z00.00 ENCOUNTER FOR GENERAL ADULT MEDICAL EXAMINATION W/OUT ABNORMAL FINDINGS: ICD-10-CM

## 2025-05-15 DIAGNOSIS — Z80.3 FAMILY HISTORY OF MALIGNANT NEOPLASM OF BREAST: ICD-10-CM

## 2025-05-15 DIAGNOSIS — R91.1 SOLITARY PULMONARY NODULE: ICD-10-CM

## 2025-05-15 DIAGNOSIS — R42 DIZZINESS AND GIDDINESS: ICD-10-CM

## 2025-05-15 DIAGNOSIS — E04.1 NONTOXIC SINGLE THYROID NODULE: ICD-10-CM

## 2025-05-15 DIAGNOSIS — Z83.438 FAMILY HISTORY OF OTHER DISORDER OF LIPOPROTEIN METABOLISM AND OTHER LIPIDEMIA: ICD-10-CM

## 2025-05-15 DIAGNOSIS — Z82.49 FAMILY HISTORY OF ISCHEMIC HEART DISEASE AND OTHER DISEASES OF THE CIRCULATORY SYSTEM: ICD-10-CM

## 2025-05-15 DIAGNOSIS — K21.9 GASTRO-ESOPHAGEAL REFLUX DISEASE W/OUT ESOPHAGITIS: ICD-10-CM

## 2025-05-15 DIAGNOSIS — I10 ESSENTIAL (PRIMARY) HYPERTENSION: ICD-10-CM

## 2025-05-15 DIAGNOSIS — E66.3 OVERWEIGHT: ICD-10-CM

## 2025-05-15 PROCEDURE — 99386 PREV VISIT NEW AGE 40-64: CPT

## 2025-05-15 RX ORDER — CHLORTHALIDONE 25 MG/1
25 TABLET ORAL
Refills: 0 | Status: ACTIVE | COMMUNITY

## 2025-06-11 PROBLEM — R79.89 LOW VITAMIN D LEVEL: Status: ACTIVE | Noted: 2025-06-11

## 2025-06-19 ENCOUNTER — RX RENEWAL (OUTPATIENT)
Age: 60
End: 2025-06-19

## 2025-07-17 ENCOUNTER — TRANSCRIPTION ENCOUNTER (OUTPATIENT)
Age: 60
End: 2025-07-17

## 2025-07-21 ENCOUNTER — APPOINTMENT (OUTPATIENT)
Dept: PULMONOLOGY | Facility: CLINIC | Age: 60
End: 2025-07-21
Payer: MEDICAID

## 2025-07-21 VITALS
RESPIRATION RATE: 16 BRPM | HEART RATE: 64 BPM | TEMPERATURE: 97.4 F | DIASTOLIC BLOOD PRESSURE: 88 MMHG | HEIGHT: 64.5 IN | BODY MASS INDEX: 28.16 KG/M2 | OXYGEN SATURATION: 99 % | WEIGHT: 167 LBS | SYSTOLIC BLOOD PRESSURE: 162 MMHG

## 2025-07-21 DIAGNOSIS — R91.1 SOLITARY PULMONARY NODULE: ICD-10-CM

## 2025-07-21 DIAGNOSIS — K21.9 GASTRO-ESOPHAGEAL REFLUX DISEASE W/OUT ESOPHAGITIS: ICD-10-CM

## 2025-07-21 DIAGNOSIS — J45.30 MILD PERSISTENT ASTHMA, UNCOMPLICATED: ICD-10-CM

## 2025-07-21 DIAGNOSIS — J31.0 CHRONIC RHINITIS: ICD-10-CM

## 2025-07-21 DIAGNOSIS — R05.3 CHRONIC COUGH: ICD-10-CM

## 2025-07-21 PROCEDURE — 99215 OFFICE O/P EST HI 40 MIN: CPT

## 2025-07-21 PROCEDURE — G2211 COMPLEX E/M VISIT ADD ON: CPT | Mod: NC

## 2025-07-24 NOTE — ED PROVIDER NOTE - QTC
DC instructions reviewed with pt. Pt states understanding of instructions and denies any questions at this time.  Pt RR even and unlabored, NAD noted at this time.  Pt able to ambulate independently down hallway with a steady gait and without incident.      
422

## 2025-07-28 ENCOUNTER — NON-APPOINTMENT (OUTPATIENT)
Age: 60
End: 2025-07-28

## 2025-07-30 ENCOUNTER — APPOINTMENT (OUTPATIENT)
Dept: ENDOCRINOLOGY | Facility: CLINIC | Age: 60
End: 2025-07-30
Payer: MEDICAID

## 2025-07-30 VITALS
BODY MASS INDEX: 28.16 KG/M2 | OXYGEN SATURATION: 99 % | HEART RATE: 65 BPM | DIASTOLIC BLOOD PRESSURE: 94 MMHG | SYSTOLIC BLOOD PRESSURE: 163 MMHG | RESPIRATION RATE: 16 BRPM | WEIGHT: 167 LBS | HEIGHT: 64.5 IN

## 2025-07-30 DIAGNOSIS — E04.1 NONTOXIC SINGLE THYROID NODULE: ICD-10-CM

## 2025-07-30 PROCEDURE — 99204 OFFICE O/P NEW MOD 45 MIN: CPT

## 2025-08-27 ENCOUNTER — APPOINTMENT (OUTPATIENT)
Dept: INTERNAL MEDICINE | Facility: CLINIC | Age: 60
End: 2025-08-27
Payer: MEDICAID

## 2025-08-27 VITALS
HEART RATE: 72 BPM | SYSTOLIC BLOOD PRESSURE: 147 MMHG | HEIGHT: 64.5 IN | OXYGEN SATURATION: 99 % | DIASTOLIC BLOOD PRESSURE: 80 MMHG | BODY MASS INDEX: 28.33 KG/M2 | WEIGHT: 168 LBS

## 2025-08-27 VITALS — DIASTOLIC BLOOD PRESSURE: 90 MMHG | SYSTOLIC BLOOD PRESSURE: 140 MMHG

## 2025-08-27 DIAGNOSIS — K21.9 GASTRO-ESOPHAGEAL REFLUX DISEASE W/OUT ESOPHAGITIS: ICD-10-CM

## 2025-08-27 DIAGNOSIS — E66.3 OVERWEIGHT: ICD-10-CM

## 2025-08-27 DIAGNOSIS — E04.1 NONTOXIC SINGLE THYROID NODULE: ICD-10-CM

## 2025-08-27 DIAGNOSIS — J45.30 MILD PERSISTENT ASTHMA, UNCOMPLICATED: ICD-10-CM

## 2025-08-27 DIAGNOSIS — I10 ESSENTIAL (PRIMARY) HYPERTENSION: ICD-10-CM

## 2025-08-27 DIAGNOSIS — J30.2 OTHER SEASONAL ALLERGIC RHINITIS: ICD-10-CM

## 2025-08-27 DIAGNOSIS — E78.00 PURE HYPERCHOLESTEROLEMIA, UNSPECIFIED: ICD-10-CM

## 2025-08-27 PROCEDURE — G2211 COMPLEX E/M VISIT ADD ON: CPT | Mod: NC

## 2025-08-27 PROCEDURE — 99214 OFFICE O/P EST MOD 30 MIN: CPT

## 2025-08-27 RX ORDER — LOSARTAN POTASSIUM 25 MG/1
25 TABLET, FILM COATED ORAL DAILY
Qty: 1 | Refills: 0 | Status: ACTIVE | COMMUNITY
Start: 2025-08-27 | End: 1900-01-01

## 2025-08-27 RX ORDER — OMEPRAZOLE 40 MG/1
40 CAPSULE, DELAYED RELEASE ORAL
Qty: 1 | Refills: 1 | Status: ACTIVE | COMMUNITY
Start: 2025-08-27 | End: 1900-01-01

## 2025-08-27 RX ORDER — AMLODIPINE BESYLATE 5 MG/1
5 TABLET ORAL
Refills: 0 | Status: DISCONTINUED | COMMUNITY
End: 2025-08-27